# Patient Record
Sex: FEMALE | Race: BLACK OR AFRICAN AMERICAN | NOT HISPANIC OR LATINO | URBAN - METROPOLITAN AREA
[De-identification: names, ages, dates, MRNs, and addresses within clinical notes are randomized per-mention and may not be internally consistent; named-entity substitution may affect disease eponyms.]

---

## 2017-07-26 ENCOUNTER — EMERGENCY (EMERGENCY)
Facility: HOSPITAL | Age: 32
LOS: 1 days | Discharge: HOME | End: 2017-07-26
Attending: EMERGENCY MEDICINE
Payer: COMMERCIAL

## 2017-07-26 VITALS
TEMPERATURE: 98 F | OXYGEN SATURATION: 100 % | DIASTOLIC BLOOD PRESSURE: 73 MMHG | RESPIRATION RATE: 20 BRPM | SYSTOLIC BLOOD PRESSURE: 116 MMHG | HEART RATE: 102 BPM | WEIGHT: 125 LBS

## 2017-07-26 LAB
ALBUMIN SERPL ELPH-MCNC: 3.7 G/DL — SIGNIFICANT CHANGE UP (ref 3.5–5)
ALP SERPL-CCNC: 53 U/L — SIGNIFICANT CHANGE UP (ref 40–120)
ALT FLD-CCNC: 32 U/L DA — SIGNIFICANT CHANGE UP (ref 10–60)
ANION GAP SERPL CALC-SCNC: 5 MMOL/L — SIGNIFICANT CHANGE UP (ref 5–17)
ANISOCYTOSIS BLD QL: SLIGHT — SIGNIFICANT CHANGE UP
APPEARANCE UR: CLEAR — SIGNIFICANT CHANGE UP
APTT BLD: 36.3 SEC — SIGNIFICANT CHANGE UP (ref 27.5–37.4)
AST SERPL-CCNC: 27 U/L — SIGNIFICANT CHANGE UP (ref 10–40)
BACTERIA # UR AUTO: ABNORMAL /HPF
BASOPHILS # BLD AUTO: 0.1 K/UL — SIGNIFICANT CHANGE UP (ref 0–0.2)
BASOPHILS NFR BLD AUTO: 1 % — SIGNIFICANT CHANGE UP (ref 0–2)
BILIRUB SERPL-MCNC: 0.2 MG/DL — SIGNIFICANT CHANGE UP (ref 0.2–1.2)
BILIRUB UR-MCNC: NEGATIVE — SIGNIFICANT CHANGE UP
BLD GP AB SCN SERPL QL: SIGNIFICANT CHANGE UP
BUN SERPL-MCNC: 9 MG/DL — SIGNIFICANT CHANGE UP (ref 7–18)
CALCIUM SERPL-MCNC: 8.9 MG/DL — SIGNIFICANT CHANGE UP (ref 8.4–10.5)
CHLORIDE SERPL-SCNC: 105 MMOL/L — SIGNIFICANT CHANGE UP (ref 96–108)
CO2 SERPL-SCNC: 29 MMOL/L — SIGNIFICANT CHANGE UP (ref 22–31)
COLOR SPEC: YELLOW — SIGNIFICANT CHANGE UP
CREAT SERPL-MCNC: 0.71 MG/DL — SIGNIFICANT CHANGE UP (ref 0.5–1.3)
DIFF PNL FLD: ABNORMAL
ELLIPTOCYTES BLD QL SMEAR: SLIGHT — SIGNIFICANT CHANGE UP
EOSINOPHIL # BLD AUTO: 0.2 K/UL — SIGNIFICANT CHANGE UP (ref 0–0.5)
EOSINOPHIL NFR BLD AUTO: 1.8 % — SIGNIFICANT CHANGE UP (ref 0–6)
EPI CELLS # UR: ABNORMAL (ref 0–10)
GLUCOSE SERPL-MCNC: 91 MG/DL — SIGNIFICANT CHANGE UP (ref 70–99)
GLUCOSE UR QL: NEGATIVE — SIGNIFICANT CHANGE UP
HCG SERPL-ACNC: <1 MIU/ML — SIGNIFICANT CHANGE UP
HCG UR QL: NEGATIVE — SIGNIFICANT CHANGE UP
HCT VFR BLD CALC: 32.6 % — LOW (ref 34.5–45)
HGB BLD-MCNC: 10.2 G/DL — LOW (ref 11.5–15.5)
HYPOCHROMIA BLD QL: SIGNIFICANT CHANGE UP
INR BLD: 1.12 RATIO — SIGNIFICANT CHANGE UP (ref 0.88–1.16)
KETONES UR-MCNC: NEGATIVE — SIGNIFICANT CHANGE UP
LEUKOCYTE ESTERASE UR-ACNC: ABNORMAL
LYMPHOCYTES # BLD AUTO: 3.8 K/UL — HIGH (ref 1–3.3)
LYMPHOCYTES # BLD AUTO: 30.3 % — SIGNIFICANT CHANGE UP (ref 13–44)
MCHC RBC-ENTMCNC: 20.9 PG — LOW (ref 27–34)
MCHC RBC-ENTMCNC: 31.3 GM/DL — LOW (ref 32–36)
MCV RBC AUTO: 66.8 FL — LOW (ref 80–100)
MICROCYTES BLD QL: SLIGHT — SIGNIFICANT CHANGE UP
MONOCYTES # BLD AUTO: 1 K/UL — HIGH (ref 0–0.9)
MONOCYTES NFR BLD AUTO: 7.8 % — SIGNIFICANT CHANGE UP (ref 2–14)
NEUTROPHILS # BLD AUTO: 7.4 K/UL — SIGNIFICANT CHANGE UP (ref 1.8–7.4)
NEUTROPHILS NFR BLD AUTO: 59.2 % — SIGNIFICANT CHANGE UP (ref 43–77)
NITRITE UR-MCNC: NEGATIVE — SIGNIFICANT CHANGE UP
OVALOCYTES BLD QL SMEAR: SLIGHT — SIGNIFICANT CHANGE UP
PH UR: 8 — SIGNIFICANT CHANGE UP (ref 5–8)
PLAT MORPH BLD: NORMAL — SIGNIFICANT CHANGE UP
PLATELET # BLD AUTO: 338 K/UL — SIGNIFICANT CHANGE UP (ref 150–400)
POIKILOCYTOSIS BLD QL AUTO: SLIGHT — SIGNIFICANT CHANGE UP
POTASSIUM SERPL-MCNC: 4.4 MMOL/L — SIGNIFICANT CHANGE UP (ref 3.5–5.3)
POTASSIUM SERPL-SCNC: 4.4 MMOL/L — SIGNIFICANT CHANGE UP (ref 3.5–5.3)
PROT SERPL-MCNC: 8.3 G/DL — SIGNIFICANT CHANGE UP (ref 6–8.3)
PROT UR-MCNC: NEGATIVE — SIGNIFICANT CHANGE UP
PROTHROM AB SERPL-ACNC: 12.2 SEC — SIGNIFICANT CHANGE UP (ref 9.8–12.7)
RBC # BLD: 4.88 M/UL — SIGNIFICANT CHANGE UP (ref 3.8–5.2)
RBC # FLD: 13.9 % — SIGNIFICANT CHANGE UP (ref 10.3–14.5)
RBC BLD AUTO: ABNORMAL
RBC CASTS # UR COMP ASSIST: >50 /HPF (ref 0–2)
SCHISTOCYTES BLD QL AUTO: SLIGHT — SIGNIFICANT CHANGE UP
SODIUM SERPL-SCNC: 139 MMOL/L — SIGNIFICANT CHANGE UP (ref 135–145)
SP GR SPEC: 1.01 — SIGNIFICANT CHANGE UP (ref 1.01–1.02)
UROBILINOGEN FLD QL: NEGATIVE — SIGNIFICANT CHANGE UP
WBC # BLD: 12.4 K/UL — HIGH (ref 3.8–10.5)
WBC # FLD AUTO: 12.4 K/UL — HIGH (ref 3.8–10.5)
WBC UR QL: SIGNIFICANT CHANGE UP /HPF (ref 0–5)

## 2017-07-26 PROCEDURE — 86901 BLOOD TYPING SEROLOGIC RH(D): CPT

## 2017-07-26 PROCEDURE — 76856 US EXAM PELVIC COMPLETE: CPT

## 2017-07-26 PROCEDURE — 86900 BLOOD TYPING SEROLOGIC ABO: CPT

## 2017-07-26 PROCEDURE — 76830 TRANSVAGINAL US NON-OB: CPT

## 2017-07-26 PROCEDURE — 81001 URINALYSIS AUTO W/SCOPE: CPT

## 2017-07-26 PROCEDURE — 80053 COMPREHEN METABOLIC PANEL: CPT

## 2017-07-26 PROCEDURE — 76830 TRANSVAGINAL US NON-OB: CPT | Mod: 26

## 2017-07-26 PROCEDURE — 81025 URINE PREGNANCY TEST: CPT

## 2017-07-26 PROCEDURE — 86850 RBC ANTIBODY SCREEN: CPT

## 2017-07-26 PROCEDURE — 85730 THROMBOPLASTIN TIME PARTIAL: CPT

## 2017-07-26 PROCEDURE — 99284 EMERGENCY DEPT VISIT MOD MDM: CPT | Mod: 25

## 2017-07-26 PROCEDURE — 84702 CHORIONIC GONADOTROPIN TEST: CPT

## 2017-07-26 PROCEDURE — 76856 US EXAM PELVIC COMPLETE: CPT | Mod: 26

## 2017-07-26 PROCEDURE — 85027 COMPLETE CBC AUTOMATED: CPT

## 2017-07-26 PROCEDURE — 85610 PROTHROMBIN TIME: CPT

## 2017-07-26 PROCEDURE — 99285 EMERGENCY DEPT VISIT HI MDM: CPT

## 2017-07-26 RX ORDER — SODIUM CHLORIDE 9 MG/ML
1000 INJECTION INTRAMUSCULAR; INTRAVENOUS; SUBCUTANEOUS
Qty: 0 | Refills: 0 | Status: DISCONTINUED | OUTPATIENT
Start: 2017-07-26 | End: 2017-07-30

## 2017-07-26 RX ORDER — SODIUM CHLORIDE 9 MG/ML
1000 INJECTION INTRAMUSCULAR; INTRAVENOUS; SUBCUTANEOUS ONCE
Qty: 0 | Refills: 0 | Status: COMPLETED | OUTPATIENT
Start: 2017-07-26 | End: 2017-07-26

## 2017-07-26 RX ADMIN — SODIUM CHLORIDE 200 MILLILITER(S): 9 INJECTION INTRAMUSCULAR; INTRAVENOUS; SUBCUTANEOUS at 15:44

## 2017-07-26 RX ADMIN — SODIUM CHLORIDE 1000 MILLILITER(S): 9 INJECTION INTRAMUSCULAR; INTRAVENOUS; SUBCUTANEOUS at 15:25

## 2017-07-26 NOTE — ED PROVIDER NOTE - OBJECTIVE STATEMENT
32 y.o. female LMP 6/17, c/o vag bleed for 1 week. heavy bleeding for past 4-5 days, changed every 2-3 hours, clots, no pelvic pain, dizziness, increased fatigue

## 2017-07-29 DIAGNOSIS — D64.9 ANEMIA, UNSPECIFIED: ICD-10-CM

## 2017-07-29 DIAGNOSIS — N92.0 EXCESSIVE AND FREQUENT MENSTRUATION WITH REGULAR CYCLE: ICD-10-CM

## 2022-09-13 NOTE — ED ADULT NURSE NOTE - CHIEF COMPLAINT QUOTE
Heavy vaginal bleed x 4 days.  Lmp unknown as patient is irregular.
Detail Level: Zone
Other (Free Text): Previously biopsied,no treatment necessary. Patient can schedule excision prn.
Note Text (......Xxx Chief Complaint.): This diagnosis correlates with the

## 2023-04-03 PROBLEM — D64.9 ANEMIA, UNSPECIFIED: Chronic | Status: ACTIVE | Noted: 2017-07-26

## 2023-08-04 ENCOUNTER — APPOINTMENT (OUTPATIENT)
Dept: INTERNAL MEDICINE | Facility: CLINIC | Age: 38
End: 2023-08-04
Payer: COMMERCIAL

## 2023-08-04 ENCOUNTER — LABORATORY RESULT (OUTPATIENT)
Age: 38
End: 2023-08-04

## 2023-08-04 VITALS
SYSTOLIC BLOOD PRESSURE: 110 MMHG | HEART RATE: 72 BPM | BODY MASS INDEX: 24.17 KG/M2 | HEIGHT: 61 IN | WEIGHT: 128 LBS | TEMPERATURE: 99.4 F | OXYGEN SATURATION: 98 % | DIASTOLIC BLOOD PRESSURE: 72 MMHG

## 2023-08-04 DIAGNOSIS — N60.11 DIFFUSE CYSTIC MASTOPATHY OF RIGHT BREAST: ICD-10-CM

## 2023-08-04 DIAGNOSIS — Z00.00 ENCOUNTER FOR GENERAL ADULT MEDICAL EXAMINATION W/OUT ABNORMAL FINDINGS: ICD-10-CM

## 2023-08-04 PROCEDURE — 99213 OFFICE O/P EST LOW 20 MIN: CPT | Mod: 25

## 2023-08-04 PROCEDURE — 99385 PREV VISIT NEW AGE 18-39: CPT | Mod: 25

## 2023-08-11 ENCOUNTER — NON-APPOINTMENT (OUTPATIENT)
Age: 38
End: 2023-08-11

## 2023-08-11 DIAGNOSIS — R79.9 ABNORMAL FINDING OF BLOOD CHEMISTRY, UNSPECIFIED: ICD-10-CM

## 2023-08-11 LAB
25(OH)D3 SERPL-MCNC: 29.3 NG/ML
CHOLEST SERPL-MCNC: 126 MG/DL
ESTIMATED AVERAGE GLUCOSE: 114 MG/DL
HBA1C MFR BLD HPLC: 5.6 %
HDLC SERPL-MCNC: 47 MG/DL
LDLC SERPL CALC-MCNC: 64 MG/DL
NONHDLC SERPL-MCNC: 80 MG/DL
TRIGL SERPL-MCNC: 80 MG/DL
TSH SERPL-ACNC: 3.13 UIU/ML
VIT B12 SERPL-MCNC: 406 PG/ML

## 2023-08-17 PROBLEM — Z00.00 ENCOUNTER FOR PREVENTIVE HEALTH EXAMINATION: Status: ACTIVE | Noted: 2023-07-28

## 2023-08-17 NOTE — PHYSICAL EXAM
[de-identified] : Gen: Adult F, NAD Head: NC/AT EENT: ears grossly normal, PERRL, EOMI, moist mucosa Neck: supple Chest wall: nontender CV: normal s1 +s2, rrr, no murmurs Pulm: CTA-B Abd: soft, NT, ND Skin: no rashes Back: no CVA tenderness, no spinal tenderness Neuro: gait normal, AAOx3 Psych: normal affect, normal interaction

## 2023-08-17 NOTE — HISTORY OF PRESENT ILLNESS
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

## 2023-08-17 NOTE — ASSESSMENT
[FreeTextEntry1] : 39yo F seen for CPE and to est care  CPE today Labs today  needs new GYN in [facility] had a mammogram  - 2/2 finding on exam - wnl told she has dense breasts  hx of thalessemia - needs to est with new heme-oncology has seen derm for her face- breaking out sees dental

## 2023-08-18 ENCOUNTER — TRANSCRIPTION ENCOUNTER (OUTPATIENT)
Age: 38
End: 2023-08-18

## 2023-08-19 ENCOUNTER — LABORATORY RESULT (OUTPATIENT)
Age: 38
End: 2023-08-19

## 2023-08-25 DIAGNOSIS — D56.9 THALASSEMIA, UNSPECIFIED: ICD-10-CM

## 2023-08-25 DIAGNOSIS — D50.8 OTHER IRON DEFICIENCY ANEMIAS: ICD-10-CM

## 2023-08-30 LAB
APPEARANCE: CLEAR
BACTERIA UR CULT: NORMAL
BACTERIA: NEGATIVE /HPF
BILIRUBIN URINE: NEGATIVE
BLOOD URINE: NEGATIVE
CAST: 0 /LPF
COLOR: YELLOW
EPITHELIAL CELLS: 0 /HPF
FERRITIN SERPL-MCNC: 15 NG/ML
GLUCOSE QUALITATIVE U: NEGATIVE MG/DL
IRON SATN MFR SERPL: 13 %
IRON SERPL-MCNC: 43 UG/DL
KETONES URINE: NEGATIVE MG/DL
LEUKOCYTE ESTERASE URINE: NEGATIVE
MICROSCOPIC-UA: NORMAL
NITRITE URINE: NEGATIVE
PH URINE: 8
PROTEIN URINE: NEGATIVE MG/DL
RED BLOOD CELLS URINE: 0 /HPF
SPECIFIC GRAVITY URINE: 1.01
TIBC SERPL-MCNC: 316 UG/DL
UIBC SERPL-MCNC: 273 UG/DL
UROBILINOGEN URINE: 0.2 MG/DL
WHITE BLOOD CELLS URINE: 0 /HPF

## 2023-12-29 ENCOUNTER — APPOINTMENT (OUTPATIENT)
Dept: OBGYN | Facility: CLINIC | Age: 38
End: 2023-12-29
Payer: COMMERCIAL

## 2023-12-29 ENCOUNTER — ASOB RESULT (OUTPATIENT)
Age: 38
End: 2023-12-29

## 2023-12-29 VITALS
HEIGHT: 61 IN | BODY MASS INDEX: 26.43 KG/M2 | DIASTOLIC BLOOD PRESSURE: 58 MMHG | WEIGHT: 140 LBS | SYSTOLIC BLOOD PRESSURE: 102 MMHG

## 2023-12-29 DIAGNOSIS — Z31.9 ENCOUNTER FOR PROCREATIVE MANAGEMENT, UNSPECIFIED: ICD-10-CM

## 2023-12-29 DIAGNOSIS — Z82.49 FAMILY HISTORY OF ISCHEMIC HEART DISEASE AND OTHER DISEASES OF THE CIRCULATORY SYSTEM: ICD-10-CM

## 2023-12-29 DIAGNOSIS — Z83.3 FAMILY HISTORY OF DIABETES MELLITUS: ICD-10-CM

## 2023-12-29 DIAGNOSIS — Z86.2 PERSONAL HISTORY OF DISEASES OF THE BLOOD AND BLOOD-FORMING ORGANS AND CERTAIN DISORDERS INVOLVING THE IMMUNE MECHANISM: ICD-10-CM

## 2023-12-29 DIAGNOSIS — D21.9 BENIGN NEOPLASM OF CONNECTIVE AND OTHER SOFT TISSUE, UNSPECIFIED: ICD-10-CM

## 2023-12-29 DIAGNOSIS — Z80.3 FAMILY HISTORY OF MALIGNANT NEOPLASM OF BREAST: ICD-10-CM

## 2023-12-29 DIAGNOSIS — Z80.0 FAMILY HISTORY OF MALIGNANT NEOPLASM OF DIGESTIVE ORGANS: ICD-10-CM

## 2023-12-29 PROCEDURE — 99385 PREV VISIT NEW AGE 18-39: CPT

## 2024-01-01 PROBLEM — Z80.3 FAMILY HISTORY OF MALIGNANT NEOPLASM OF BREAST: Status: ACTIVE | Noted: 2024-01-01

## 2024-01-01 PROBLEM — Z82.49 FAMILY HISTORY OF CARDIAC DISORDER: Status: ACTIVE | Noted: 2024-01-01

## 2024-01-01 PROBLEM — Z83.3 FAMILY HISTORY OF DIABETES MELLITUS: Status: ACTIVE | Noted: 2024-01-01

## 2024-01-01 PROBLEM — Z80.0 FAMILY HISTORY OF MALIGNANT NEOPLASM OF ESOPHAGUS: Status: ACTIVE | Noted: 2024-01-01

## 2024-01-01 PROBLEM — Z86.2 HISTORY OF ANEMIA: Status: RESOLVED | Noted: 2024-01-01 | Resolved: 2024-01-01

## 2024-01-01 LAB
C TRACH RRNA SPEC QL NAA+PROBE: NOT DETECTED
HPV HIGH+LOW RISK DNA PNL CVX: NOT DETECTED
N GONORRHOEA RRNA SPEC QL NAA+PROBE: NOT DETECTED
SOURCE AMPLIFICATION: NORMAL

## 2024-01-01 NOTE — DISCUSSION/SUMMARY
[FreeTextEntry1] : 38 year old P0 presenting for annual exam -f/u pap and GC/CT -Fibroids: small myoma, asymptomatic - will continue to monitor -Contraception: none, okay with pregnancy, taking PNV, TTC information given -f/u PRN

## 2024-01-01 NOTE — HISTORY OF PRESENT ILLNESS
[FreeTextEntry1] : Patient is a 38 year old P0 presenting for an annual visit. LMP 12/8/23. She is feeling well and is without complaints. She denies vaginal itching, odor and discharge. Denies urinary urgency, frequency and dysuria. She is currently sexually active with one male partner considering starting a family soon. Patient reports a diagnosis of fibroids but does not know any further information.    [Patient reported PAP Smear was normal] : Patient reported PAP Smear was normal [PapSmeardate] : 12/2022

## 2024-01-01 NOTE — PROCEDURE
[Transvaginal Ultrasound] : transvaginal ultrasound [FreeTextEntry3] : Small 1cm intramural fundal myoma, normal ovaries

## 2024-01-09 LAB — CYTOLOGY CVX/VAG DOC THIN PREP: NORMAL

## 2024-01-23 LAB
3-BETA-HYDROXYSTEROID DEHYDROGENASE II: NEGATIVE
3-HYDROXY-3-METHYLGLUTARYL-COA LYASE DEF: NEGATIVE
3-METHYLCROTONYL-COA CARBOXYLASE 1 DEFIC: NEGATIVE
3-METHYLCROTONYL-COA CARBOXYLASE 2 DEFIC: NEGATIVE
3-PHOSPHOGLYCERATE DEHYDROGENASE DEFICIE: NEGATIVE
6-PYRUVOYL-TETRAHYDROPTERIN SYNTHASE: NEGATIVE
ABETALIPOPROTEINEMIA: NEGATIVE
ACHONDROGENESIS, TYPE 1B: NEGATIVE
ACHROMATOPSIA: NEGATIVE
ACRODERMATITIS ENTEROPATHICA: NEGATIVE
ACUTE INFANTILE LIVER FAILURE: NEGATIVE
ACYL-COA OXIDASE I DEFICIENCY: NEGATIVE
ADRENOLEUKODYSTROPHY: NEGATIVE
AICARDI-GOUTIÈRES SYNDROME: NEGATIVE
ALPHA THALASSEMIA MENTAL RETARDATION SYN: NEGATIVE
ALPHA-MANNOSIDOSIS: NEGATIVE
ALPHA-THALASSEMIA: POSITIVE
ALPORT SYNDROME, COL4A3-RELATED: NEGATIVE
ALPORT SYNDROME, COL4A4-RELATED: NEGATIVE
ALPORT SYNDROME, X-LINKED: NEGATIVE
ALSTROM SYNDROME: NEGATIVE
ANDERMANN SYNDROME: NEGATIVE
ARGININOSUCCINATE LYASE DEFICIENCY: NEGATIVE
AROMATASE DEFICIENCY: NEGATIVE
ASPARAGINE SYNTHETASE DEFICIENCY: NEGATIVE
ASPARTYLGLYCOSAMINURIA: NEGATIVE
ATAXIA WITH VITAMIN E DEFICIENCY: NEGATIVE
ATAXIA-TELANGIECTASIA: NEGATIVE
AUTISM SPECTRUM, EPILEPSY AND ARTHROGRYP: NEGATIVE
AUTOSOMAL RECESSIVE SPASTIC ATAXIA OF CH: NEGATIVE
BARDET-BIEDL SYNDROME, BBS1-RELATED: NEGATIVE
BARDET-BIEDL SYNDROME, BBS10-RELATED: NEGATIVE
BARDET-BIEDL SYNDROME, BBS12-RELATED: NEGATIVE
BARDET-BIEDL SYNDROME, BBS2-RELATED: NEGATIVE
BARE LYMPHOCYTE SYNDROME, TYPE II: NEGATIVE
BARTTER SYNDROME: NEGATIVE
BATTEN DISEASE: NEGATIVE
BETA-HEMOGLOBINOPATHIES: NEGATIVE
BILATERAL FRONTOPARIETAL POLYMICROGYRIA: NEGATIVE
BIOTINIDASE DEFICIENCY: NEGATIVE
BLOOM SYNDROME: NEGATIVE
CANAVAN DISEASE: NEGATIVE
CARBAMOYL PHOSPHATE SYNTHETASE I DEF: NEGATIVE
CARNITINE DEFICIENCY: NEGATIVE
CARNITINE PALMITOYLTRANSFERASE IA DEF: NEGATIVE
CARNITINE PALMITOYLTRANSFERASE II DEF: NEGATIVE
CARPENTER SYNDROME: NEGATIVE
CARTILAGE-HAIR HYPOPLASIA: NEGATIVE
CEREBROTENDINOUS XANTHOMATOSIS: NEGATIVE
CFTR MUT ANL BLD/T: NEGATIVE
CHARCOT-MARIE-TOOTH DISEASE WITH DEAFNES: NEGATIVE
CHARCOT-MARIE-TOOTH DISEASE, TYPE 4D: NEGATIVE
CHOREOACANTHOCYTOSIS: NEGATIVE
CHOROIDEREMIA: NEGATIVE
CHRONIC GRANULOMATOUS DISEASE, CYTOCHROM: NEGATIVE
CHRONIC GRANULOMATOUS DISEASE, X-LINKED: NEGATIVE
CILIOPATHIES, RPGRIP1L-RELATED: NEGATIVE
CITRIN DEFICIENCY: NEGATIVE
CITRULLINEMIA, TYPE I: NEGATIVE
COHEN SYNDROME: NEGATIVE
COMBINED MALONIC AND METHYLMALONIC ACIDU: NEGATIVE
COMBINED OXIDATIVE PHOSPHORYLATION DEF 3: NEGATIVE
COMBINED OXIDATIVE PHOSPHORYLATION DEF 4: NEGATIVE
COMBINED PITUITARY HORMONE DEFICIENCY-2: NEGATIVE
CONGENITAL ADRENAL HYPERPLASIA, 17-ALPHA: NEGATIVE
CONGENITAL AMEGAKARYOCYTIC THROMBOCYTOPE: NEGATIVE
CONGENITAL DISORDER OF GLYCOSYLATION 1C: NEGATIVE
CONGENITAL DISORDER OF GLYCOSYLATION IA: NEGATIVE
CONGENITAL DISORDER OF GLYCOSYLATION IB: NEGATIVE
CONGENITAL FINNISH NEPHROSIS: NEGATIVE
CONGENITAL INSENSIVITY TO PAIN WITH ANHI: NEGATIVE
CONGENITAL MYASTHENIC SYNDROME, CHRNE: NEGATIVE
CONGENITAL MYASTHENIC SYNDROME, RAPSN-RE: NEGATIVE
CONGENITAL NEUTROPENIA, HAX1-RELATED: NEGATIVE
CONGENITAL NEUTROPENIA, VPS45-RELATED: NEGATIVE
CORNEAL DYSTROPHY AND PERCEPTIVE DEAFNES: NEGATIVE
CORTICOSTERONE METHYLOXIDASE DEFICIENCY: NEGATIVE
COSTEFF DISEASE OPTIC ATROPHY: NEGATIVE
CRB1-RELATED RETINAL DYSTROPHIES: NEGATIVE
CREATINE TRANSPORTER DEFECT: NEGATIVE
CYSTINOSIS: NEGATIVE
D-BIFUNCTIONAL PROTEIN DEFICIENCY: NEGATIVE
DEAFNESS, AUTOSOMAL RECESSIVE 77: NEGATIVE
DMD GENE MUT ANL BLD/T: NEGATIVE
DYSKERATOSIS CONGENITA: NEGATIVE
DYSTROPHIC EPIDERMOLYSIS BULLOSA: NEGATIVE
EHLERS-DANLOS SYNDROME, TYPE VIIC: NEGATIVE
ELLIS-VAN CREVELD SYNDOME: NEGATIVE
EMERY-DREIFUSS MUSCULAR DYSTROPHY 1 X-LI: NEGATIVE
ENHANCED S-CONE SYNDROME: NEGATIVE
ETHYLMALONIC ENCEPHALOPATHY: NEGATIVE
FABRY DISEASE: NEGATIVE
FACTOR IX DEFICIENCY: NEGATIVE
FACTOR XI DEFICIENCY: NEGATIVE
FAMILIAL DYSAUTONOMIA: NEGATIVE
FAMILIAL HYPERCHOLESTEROLEMIA, LDLR: NEGATIVE
FAMILIAL HYPERCHOLESTEROLEMIA, LDLRAP1: NEGATIVE
FAMILIAL HYPERINSULINISM: NEGATIVE
FAMILIAL MEDITERRANEAN FEVER: NEGATIVE
FAMILIAL NEUROPOPHYSEAL DIABETES INSIPID: NEGATIVE
FANCONI ANEMIA, GROUP A: NEGATIVE
FANCONI ANEMIA, GROUP C: NEGATIVE
FANCONI ANEMIA, GROUP G: NEGATIVE
FRAGILE X PROTEIN (FMRP) BLD-IMP: NEGATIVE
FUMARASE DEFICIENCY: NEGATIVE
GALACTOKINASE DEFICIENCY: NEGATIVE
GALACTOSEMIA: NEGATIVE
GAUCHER DISEASE: NEGATIVE
GITELMAN SYNDROME: NEGATIVE
GLUTARIC ACIDEMIA, TYPE 2A: NEGATIVE
GLUTARIC ACIDEMIA, TYPE 2C: NEGATIVE
GLUTARYL-COA DEHYDROGENASE DEFICIENCY: NEGATIVE
GLYCINE ENCEPHALOPATHY, AMT-RELATED: NEGATIVE
GLYCINE ENCEPHALOPATHY: NEGATIVE
GLYCOGEN STORAGE DISEASE, TYPE 1A: NEGATIVE
GLYCOGEN STORAGE DISEASE, TYPE 2: NEGATIVE
GLYCOGEN STORAGE DISEASE, TYPE 3: NEGATIVE
GLYCOGEN STORAGE DISEASE, TYPE 4: NEGATIVE
GLYCOGEN STORAGE DISEASE, TYPE 5: NEGATIVE
GLYCOGEN STORAGE DISEASE, TYPE IB: NEGATIVE
GLYCOGEN STORAGE DISEASE, TYPE VII: NEGATIVE
GRACILE SYNDROME: NEGATIVE
GUANIDINOACETATE METHYLTRANSFERASE DEFIC: NEGATIVE
HEMOCHROMATOSIS, TYPE 2A: NEGATIVE
HEMOCHROMATOSIS, TYPE 3, TFR2-RELATED: NEGATIVE
HEREDITARY FRUCTOSE INTOLERANCE: NEGATIVE
HEREDITARY SPASTIC PARAPARESIS, TYPE 49: NEGATIVE
HERMANSKY-PUDLAK SYNDROME, HPS1-RELATED: NEGATIVE
HERMANSKY-PUDLAK SYNDROME, HPS3-RELATED: NEGATIVE
HEXOSAMINIDASE A & B BLD-IMP: NEGATIVE
HOLOCARBOXYLASE SYNTHETASE DEFICIENCY: NEGATIVE
HOMOCYSTINURIA DUE TO DEFIC OF MTHFR: NEGATIVE
HOMOCYSTINURIA, TYPE CBLE: NEGATIVE
HOMOCYSTINURIA: NEGATIVE
HYDROLETHALUS SYNDROME: NEGATIVE
HYPERINSULINEMIC HYPOGLYCEMIA: NEGATIVE
HYPERORNITHINEMIA-HYPERAMMONEMIA-HOMOCIT: NEGATIVE
HYPOHIDROTIC ECTODERMAL DYSPLASIA X-LINK: NEGATIVE
HYPOPHOSPHATASIA, AUTOSOMAL RECESSIVE: NEGATIVE
INCLUSION BODY MYOPATHY 2: NEGATIVE
INFANTILE CEREBRAL AND CEREBELLAR ATROPH: NEGATIVE
ISOVALERIC ACIDEMIA: NEGATIVE
JOUBERT SYNDROME 2: NEGATIVE
KETOTHIOLASE DEFICIENCY: NEGATIVE
KRABBE DISEASE: NEGATIVE
LAMELLAR ICHTHYOSIS, TYPE 1: NEGATIVE
LEBER CONGENITAL AMAUROSIS 2: NEGATIVE
LEBER CONGENITAL AMAUROSIS, TYPE CEP290: NEGATIVE
LEBER CONGENITAL AMAUROSIS, TYPE LCA5: NEGATIVE
LEBER CONGENITAL AMAUROSIS, TYPE RDH12: NEGATIVE
LEIGH SYNDROME, FRENCH-CANADIAN TYPE: NEGATIVE
LETHAL CONGENITAL CONTRACTURE SYNDROME 1: NEGATIVE
LEUKOENCEPHALOPATHY WITH VANISHING WHITE: NEGATIVE
LIMB GIRDLE MUSCULAR DYSTROPHY, TYPE 2A: NEGATIVE
LIMB GIRDLE MUSCULAR DYSTROPHY, TYPE 2B: NEGATIVE
LIMB GIRDLE MUSCULAR DYSTROPHY, TYPE 2I: NEGATIVE
LIMB-GIRDLE MUSCULAR DYSTROPHY, TYPE 2C: NEGATIVE
LIMB-GIRDLE MUSCULAR DYSTROPHY, TYPE 2D: NEGATIVE
LIMB-GIRDLE MUSCULAR DYSTROPHY, TYPE 2E: NEGATIVE
LIPOAMIDE DEHYDROGENASE DEFICIENCY: NEGATIVE
LIPOID ADRENAL HYPERPLASIA: NEGATIVE
LIPOPROTEIN LIPASE DEFICIENCY: NEGATIVE
LONG CHAIN 3-HYDROXYACYL-COA DEHYDROGENA: NEGATIVE
LYSINURIC PROTEIN INTOLERANCE: NEGATIVE
MAPLE SYRUP URINE DISEASE, TYPE 1A: NEGATIVE
MAPLE SYRUP URINE DISEASE, TYPE 1B: NEGATIVE
MECKEL-GRUBER SYNDROME, TYPE 1: NEGATIVE
MEDIUM CHAIN ACYL-COA DEHYDROGENASE DEFICIENCY: NEGATIVE
MEGALENCEPHALIC LEUKOENCEPHALOPATHY: NEGATIVE
METACHROMATIC LEUKODYSTROPHY GAUCHER: NEGATIVE
METACHROMATIC LEUKODYSTROPHY, ARSA: NEGATIVE
METHYLMALONIC ACIDURIA AND HOMOCYST CBIC: NEGATIVE
METHYLMALONIC ACIDURIA AND HOMOCYST CBID: NEGATIVE
METHYLMALONIC ACIDURIA, MMAA-RELATED: NEGATIVE
METHYLMALONIC ACIDURIA, MMAB-RELATED: NEGATIVE
METHYLMALONIC ACIDURIA, TYPE MUT(0): NEGATIVE
MICROPHTHALMIA/ANOPHTHALMIA: NEGATIVE
MITOCHONDRIAL COMPLEX 1 DEFIC NDUFAF5: NEGATIVE
MITOCHONDRIAL COMPLEX 1 DEFIC NDUFS6: NEGATIVE
MITOCHONDRIAL DNA DEPLETION SYNDROME 6: NEGATIVE
MITOCHONDRIAL MYOPATHY AND SIDEROBLASTIC: NEGATIVE
MUCOLIPIDOSIS II/IIIA: NEGATIVE
MUCOLIPIDOSIS III GAMMA: NEGATIVE
MUCOLIPIDOSIS, TYPE IV: NEGATIVE
MUCOPOLYSACCHARIDOSIS, TYPE I: NEGATIVE
MUCOPOLYSACCHARIDOSIS, TYPE II: NEGATIVE
MUCOPOLYSACCHARIDOSIS, TYPE IIIA: NEGATIVE
MUCOPOLYSACCHARIDOSIS, TYPE IIIB: NEGATIVE
MUCOPOLYSACCHARIDOSIS, TYPE IIIC: NEGATIVE
MUCOPOLYSACCHARIDOSIS, TYPE IIID: NEGATIVE
MUCOPOLYSACCHARIDOSIS, TYPE IVB: NEGATIVE
MUCOPOLYSACCHARIDOSIS, TYPE IX: NEGATIVE
MUCOPOLYSACCHARIDOSIS, TYPE VI: NEGATIVE
MULTIPLE SULPHATASE DEFICIENCY: NEGATIVE
MUSCLE-EYE-BRAIN DISEASE, POMGNT1-RELATE: NEGATIVE
MYONEUROGASTROINTESTINAL ENCEPHALOPATHY: NEGATIVE
MYOTUBULAR MYOPATHY, X-LINKED: NEGATIVE
N-ACETYLGLUTAMATE SYNTHASE DEFICIENCY: NEGATIVE
NEMALINE MYOPATHY: NEGATIVE
NEURONAL CEROID LIPOFUSCINOSIS, CLN5: NEGATIVE
NEURONAL CEROID LIPOFUSCINOSIS, MFSD8: NEGATIVE
NEURONAL CEROID LIPOFUSCINOSIS, PPT1-REL: NEGATIVE
NEURONAL CEROID LIPOFUSCINOSIS, TPP1-REL: NEGATIVE
NEURONAL CEROID-LIPOFUSCINOSIS, CLN6: NEGATIVE
NEURONAL CEROID-LIPOFUSCINOSIS, CLN8: NEGATIVE
NIEMANN PICK DISEASE, TYPE C1/D: NEGATIVE
NIEMANN PICK DISEASE, TYPE C2: NEGATIVE
NIEMANN-PICK DISEASE, TYPES A/B: NEGATIVE
NIJMEGEN BREAKAGE SYNDROME: NEGATIVE
NON-SYNDROMIC HEARING LOSS, GJB2-RELATED: NEGATIVE
OCCIPITAL HORN SYNDROME: NEGATIVE
ODONTO-ONYCHO-DERMAL DYSPLASIA: NEGATIVE
OMENN SYNDROME: NEGATIVE
ORNITHINE AMINOTRANSFERASE DEFICIENCY: NEGATIVE
ORNITHINE TRANSCARBAMYLASE DEFICIENCY: NEGATIVE
OSTEOPETROSIS, INFANTILE MALIGNANT: NEGATIVE
PENDRED SYNDROME: NEGATIVE
PHENYLKETONURIA: NEGATIVE
PITUITARY HORMONE DEFICIENCY, COMBINED 3: NEGATIVE
POLYCYSTIC KIDNEY DISEASE, AUTOSOMAL RECESSIVE: NEGATIVE
POLYGLANDULAR AUTOIMMUNE SYNDROME: NEGATIVE
PONTOCEREBELLAR HYPOPLASIA, RARS2-RELATE: NEGATIVE
PONTOCEREBELLAR HYPOPLASIA, TYPE 1A: NEGATIVE
PRIMARY CILIARY DYSKINESIA DNAH5-RELATED: NEGATIVE
PRIMARY CILIARY DYSKINESIA DNAI1-RELATED: NEGATIVE
PRIMARY CILIARY DYSKINESIA DNAI2-RELATED: NEGATIVE
PRIMARY HYPEROXALURIA, TYPE 1: NEGATIVE
PRIMARY HYPEROXALURIA, TYPE 2: NEGATIVE
PRIMARY HYPEROXALURIA, TYPE 3: NEGATIVE
PROGRESSIVE CEREBELLO-CEREBRAL ATROPHY: NEGATIVE
PROGRESSIVE FAMILIAL INTRAHEPATIC CHOLES: NEGATIVE
PROPIONIC ACIDEMIA, ALPHA SUBUNIT: NEGATIVE
PROPIONIC ACIDEMIA, BETA SUBUNIT: NEGATIVE
PYCNODYSOSTOSIS: NEGATIVE
PYRUVATE DEHYDROGENASE DEFICIENCY AUTOSO: NEGATIVE
PYRUVATE DEHYDROGENASE DEFICIENCY X-LINK: NEGATIVE
RENAL TUBULAR ACIDOSIS AND DEAFNESS: NEGATIVE
REPRODUCTIVE CARRIER MULTIGENE ANL BLD/T: POSITIVE
RETINITIS PIGMENTOSA 25: NEGATIVE
RETINITIS PIGMENTOSA 26: NEGATIVE
RETINITIS PIGMENTOSA 28: NEGATIVE
RETINITIS PIGMENTOSA 59: NEGATIVE
RHIZOMELIC CHONDRODYSPLASIA PUNCTATA 3: NEGATIVE
RHIZOMELIC CHONDRODYSPLASIA PUNCTATA I: NEGATIVE
RIBOFLAVIN RESPONSIVE COMPLEX 1 DEF: NEGATIVE
ROBERTS SYNDROME: NEGATIVE
SALLA DISEASE: NEGATIVE
SANDHOFF DISEASE: NEGATIVE
SCHIMKE IMMUNOOSSEOUS DYSPLASIA: NEGATIVE
SEGAWA SYNDROME, AUTOSOMAL RECESSIVE: NEGATIVE
SEVERE COMBINED IMMUNODEFICIENCY, TYPE A: NEGATIVE
SEVERE COMBINED IMMUNODEFICIENCY: NEGATIVE
SJOGREN-LARSSON SYNDROME: NEGATIVE
SMITH-LEMLI-OPITZ SYNDROME: NEGATIVE
SMN1 GENE MUT ANL BLD/T: NEGATIVE
SPONDYLOTHORACIC DYSOSTOSIS: NEGATIVE
STEROID-RESISTANT NEPHROTIC SYNDROME: NEGATIVE
STUVE-WIEDEMANN SYNDROME: NEGATIVE
TEST PERFORMANCE INFO SPEC: NORMAL
TYROSINEMIA, TYPE I: NEGATIVE
USHER SYNDROME, TYPE 1B: NEGATIVE
USHER SYNDROME, TYPE 1C: NEGATIVE
USHER SYNDROME, TYPE 1D: NEGATIVE
USHER SYNDROME, TYPE 1F: NEGATIVE
USHER SYNDROME, TYPE 2A: NEGATIVE
USHER SYNDROME, TYPE 3: NEGATIVE
VERY LONG CHAIN ACYL-COA DEHYDROGENASE: NEGATIVE
WALKER-WARBURG SYNDROME: NEGATIVE
WILSON DISEASE: NEGATIVE
WOLMAN DISEASE: NEGATIVE
X-LINKED JUVENILE RETINOSCHISIS: NEGATIVE
X-LINKED SEVERE COMBINED IMMUNODEFICIENC: NEGATIVE
ZELLWEGER SPECTRUM DISORDERS, PEX1-RELAT: NEGATIVE
ZELLWEGER SPECTRUM DISORDERS, PEX10-RELA: NEGATIVE
ZELLWEGER SPECTRUM DISORDERS, PEX2-RELAT: NEGATIVE
ZELLWEGER SPECTRUM DISORDERS, PEX6-RELAT: NEGATIVE

## 2024-03-27 ENCOUNTER — NON-APPOINTMENT (OUTPATIENT)
Age: 39
End: 2024-03-27

## 2024-03-29 DIAGNOSIS — Z86.2 PERSONAL HISTORY OF DISEASES OF THE BLOOD AND BLOOD-FORMING ORGANS AND CERTAIN DISORDERS INVOLVING THE IMMUNE MECHANISM: ICD-10-CM

## 2024-03-29 RX ORDER — CHLORHEXIDINE GLUCONATE 4 %
325 (65 FE) LIQUID (ML) TOPICAL 3 TIMES DAILY
Qty: 270 | Refills: 1 | Status: ACTIVE | COMMUNITY
Start: 2023-08-25 | End: 1900-01-01

## 2024-06-10 ENCOUNTER — TRANSCRIPTION ENCOUNTER (OUTPATIENT)
Age: 39
End: 2024-06-10

## 2024-06-11 ENCOUNTER — APPOINTMENT (OUTPATIENT)
Dept: INTERNAL MEDICINE | Facility: CLINIC | Age: 39
End: 2024-06-11
Payer: COMMERCIAL

## 2024-06-11 VITALS
SYSTOLIC BLOOD PRESSURE: 107 MMHG | HEART RATE: 101 BPM | OXYGEN SATURATION: 99 % | TEMPERATURE: 97.3 F | DIASTOLIC BLOOD PRESSURE: 69 MMHG | BODY MASS INDEX: 26.24 KG/M2 | HEIGHT: 61 IN | WEIGHT: 139 LBS

## 2024-06-11 DIAGNOSIS — R05.3 CHRONIC COUGH: ICD-10-CM

## 2024-06-11 DIAGNOSIS — R11.2 NAUSEA WITH VOMITING, UNSPECIFIED: ICD-10-CM

## 2024-06-11 DIAGNOSIS — J39.2 OTHER DISEASES OF PHARYNX: ICD-10-CM

## 2024-06-11 DIAGNOSIS — Z02.89 ENCOUNTER FOR OTHER ADMINISTRATIVE EXAMINATIONS: ICD-10-CM

## 2024-06-11 PROCEDURE — 36415 COLL VENOUS BLD VENIPUNCTURE: CPT

## 2024-06-11 PROCEDURE — 99214 OFFICE O/P EST MOD 30 MIN: CPT

## 2024-06-11 RX ORDER — ALBUTEROL SULFATE 90 UG/1
108 (90 BASE) AEROSOL, METERED RESPIRATORY (INHALATION) EVERY 6 HOURS
Qty: 1 | Refills: 3 | Status: COMPLETED | COMMUNITY
Start: 2024-03-29 | End: 2024-06-11

## 2024-06-11 RX ORDER — AZITHROMYCIN 250 MG/1
250 TABLET, FILM COATED ORAL
Qty: 1 | Refills: 0 | Status: COMPLETED | COMMUNITY
Start: 2024-03-29 | End: 2024-06-11

## 2024-06-11 NOTE — ASSESSMENT
[FreeTextEntry1] : 38yo F with fibroids, anemia and chronic cough seen for acute visit for dry throat in setting of nausea and vomiting and frequent loose stool  Loose stools - no recent abx or travel, less likely gastroenteritis or c.diff still will send stool cx and c diff   nausea vomitting - ddx includes pregnancy, GI lesion, gastritis, food insensitivity/allergy.         send serum hcg, check H Pylori          cont to hydrate            can consider food log but other testing was neg in recent past  Dry throat - can try taking OTC allergy medication  - will check for Sjogrens                 ENT referral

## 2024-06-11 NOTE — PHYSICAL EXAM
[Well Nourished] : well nourished [Well Developed] : well developed [PERRL] : pupils equal round and reactive to light [No Lymphadenopathy] : no lymphadenopathy [Supple] : supple [No Accessory Muscle Use] : no accessory muscle use [Clear to Auscultation] : lungs were clear to auscultation bilaterally [Regular Rhythm] : with a regular rhythm [Normal S1, S2] : normal S1 and S2 [No Edema] : there was no peripheral edema [Normal Supraclavicular Nodes] : no supraclavicular lymphadenopathy [Normal Axillary Nodes] : no axillary lymphadenopathy [Normal Anterior Cervical Nodes] : no anterior cervical lymphadenopathy [Normal Affect] : the affect was normal [Normal Insight/Judgement] : insight and judgment were intact [de-identified] : +wax in both ears, small bumps, skin colored, no enlarged tonsils, well hydrated, no exudate

## 2024-06-14 ENCOUNTER — APPOINTMENT (OUTPATIENT)
Dept: INTERNAL MEDICINE | Facility: CLINIC | Age: 39
End: 2024-06-14
Payer: COMMERCIAL

## 2024-06-14 LAB
ALBUMIN SERPL ELPH-MCNC: 4.4 G/DL
ALP BLD-CCNC: 45 U/L
ALT SERPL-CCNC: 21 U/L
ANION GAP SERPL CALC-SCNC: 12 MMOL/L
APPEARANCE: CLEAR
AST SERPL-CCNC: 21 U/L
BACTERIA UR CULT: NORMAL
BACTERIA: ABNORMAL /HPF
BILIRUB SERPL-MCNC: 0.5 MG/DL
BILIRUBIN URINE: NEGATIVE
BLOOD URINE: NEGATIVE
BUN SERPL-MCNC: 4 MG/DL
CALCIUM SERPL-MCNC: 9.1 MG/DL
CAST: 0 /LPF
CHLORIDE SERPL-SCNC: 105 MMOL/L
CHOLEST SERPL-MCNC: 116 MG/DL
CO2 SERPL-SCNC: 21 MMOL/L
COLOR: YELLOW
CREAT SERPL-MCNC: 0.65 MG/DL
EGFR: 116 ML/MIN/1.73M2
ENA SS-A AB SER IA-ACNC: 5.7 AL
ENA SS-B AB SER IA-ACNC: <0.2 AL
EPITHELIAL CELLS: 6 /HPF
ESTIMATED AVERAGE GLUCOSE: 120 MG/DL
GLUCOSE QUALITATIVE U: NEGATIVE MG/DL
GLUCOSE SERPL-MCNC: 85 MG/DL
HBA1C MFR BLD HPLC: 5.8 %
HBV SURFACE AB SERPL IA-ACNC: 5 MIU/ML
HBV SURFACE AG SER QL: NONREACTIVE
HCG SERPL-MCNC: 301 MIU/ML
HCT VFR BLD CALC: 36.3 %
HDLC SERPL-MCNC: 41 MG/DL
HGB BLD-MCNC: 11 G/DL
KETONES URINE: NEGATIVE MG/DL
LDLC SERPL CALC-MCNC: 62 MG/DL
LEUKOCYTE ESTERASE URINE: ABNORMAL
M TB IFN-G BLD-IMP: NEGATIVE
MCHC RBC-ENTMCNC: 20.8 PG
MCHC RBC-ENTMCNC: 30.3 GM/DL
MCV RBC AUTO: 68.8 FL
MEV IGG FLD QL IA: >300 AU/ML
MEV IGG+IGM SER-IMP: POSITIVE
MICROSCOPIC-UA: NORMAL
MUV AB SER-ACNC: NEGATIVE
MUV IGG SER QL IA: 8.9 AU/ML
NITRITE URINE: NEGATIVE
NONHDLC SERPL-MCNC: 75 MG/DL
PH URINE: 7
PLATELET # BLD AUTO: 367 K/UL
POTASSIUM SERPL-SCNC: 4.1 MMOL/L
PROT SERPL-MCNC: 7.4 G/DL
PROTEIN URINE: NORMAL MG/DL
QUANTIFERON TB PLUS MITOGEN MINUS NIL: >10 IU/ML
QUANTIFERON TB PLUS NIL: 0.04 IU/ML
QUANTIFERON TB PLUS TB1 MINUS NIL: 0.01 IU/ML
QUANTIFERON TB PLUS TB2 MINUS NIL: 0 IU/ML
RBC # BLD: 5.28 M/UL
RBC # FLD: 16.5 %
RED BLOOD CELLS URINE: 1 /HPF
REVIEW: NORMAL
RUBV IGG FLD-ACNC: 2.1 INDEX
RUBV IGG SER-IMP: POSITIVE
SODIUM SERPL-SCNC: 138 MMOL/L
SPECIFIC GRAVITY URINE: 1.02
TRIGL SERPL-MCNC: 57 MG/DL
UROBILINOGEN URINE: 0.2 MG/DL
VZV AB TITR SER: POSITIVE
VZV IGG SER IF-ACNC: 1990 INDEX
WBC # FLD AUTO: 10.61 K/UL
WHITE BLOOD CELLS URINE: 1 /HPF

## 2024-06-14 PROCEDURE — 99443: CPT

## 2024-06-18 ENCOUNTER — TRANSCRIPTION ENCOUNTER (OUTPATIENT)
Age: 39
End: 2024-06-18

## 2024-06-27 ENCOUNTER — APPOINTMENT (OUTPATIENT)
Dept: OBGYN | Facility: CLINIC | Age: 39
End: 2024-06-27
Payer: COMMERCIAL

## 2024-06-27 ENCOUNTER — APPOINTMENT (OUTPATIENT)
Dept: ANTEPARTUM | Facility: CLINIC | Age: 39
End: 2024-06-27

## 2024-06-27 VITALS
HEIGHT: 61 IN | SYSTOLIC BLOOD PRESSURE: 117 MMHG | DIASTOLIC BLOOD PRESSURE: 79 MMHG | BODY MASS INDEX: 26.81 KG/M2 | WEIGHT: 142 LBS

## 2024-06-27 DIAGNOSIS — N92.6 IRREGULAR MENSTRUATION, UNSPECIFIED: ICD-10-CM

## 2024-06-27 PROCEDURE — 99214 OFFICE O/P EST MOD 30 MIN: CPT

## 2024-06-27 PROCEDURE — 36415 COLL VENOUS BLD VENIPUNCTURE: CPT

## 2024-06-28 LAB
ABO + RH PNL BLD: NORMAL
APPEARANCE: CLEAR
BACTERIA: NEGATIVE /HPF
BILIRUBIN URINE: NEGATIVE
BLOOD URINE: NEGATIVE
C TRACH RRNA SPEC QL NAA+PROBE: NOT DETECTED
CAST: 0 /LPF
COLOR: YELLOW
EPITHELIAL CELLS: 1 /HPF
ESTIMATED AVERAGE GLUCOSE: 117 MG/DL
GLUCOSE QUALITATIVE U: NEGATIVE MG/DL
GLUCOSE SERPL-MCNC: 83 MG/DL
HAV IGM SER QL: NONREACTIVE
HBA1C MFR BLD HPLC: 5.7 %
HBV CORE IGM SER QL: NONREACTIVE
HBV SURFACE AG SER QL: NONREACTIVE
HCT VFR BLD CALC: 37.6 %
HCV AB SER QL: NONREACTIVE
HCV S/CO RATIO: 0.07 S/CO
HGB A MFR BLD: 97.7 %
HGB A2 MFR BLD: 2.3 %
HGB BLD-MCNC: 10.9 G/DL
HGB FRACT BLD-IMP: NORMAL
HIV1+2 AB SPEC QL IA.RAPID: NONREACTIVE
KETONES URINE: NEGATIVE MG/DL
LEUKOCYTE ESTERASE URINE: ABNORMAL
MCHC RBC-ENTMCNC: 20.5 PG
MCHC RBC-ENTMCNC: 29 GM/DL
MCV RBC AUTO: 70.8 FL
MEV IGG FLD QL IA: >300 AU/ML
MEV IGG+IGM SER-IMP: POSITIVE
MICROSCOPIC-UA: NORMAL
MUV AB SER-ACNC: NEGATIVE
MUV IGG SER QL IA: 8.59 AU/ML
N GONORRHOEA RRNA SPEC QL NAA+PROBE: NOT DETECTED
NITRITE URINE: NEGATIVE
PH URINE: 7
PLATELET # BLD AUTO: 453 K/UL
PROTEIN URINE: NEGATIVE MG/DL
RBC # BLD: 5.31 M/UL
RBC # FLD: 16.5 %
RED BLOOD CELLS URINE: 0 /HPF
REVIEW: NORMAL
RUBV IGG FLD-ACNC: 2.1 INDEX
RUBV IGG SER-IMP: POSITIVE
SOURCE AMPLIFICATION: NORMAL
SPECIFIC GRAVITY URINE: 1.01
T PALLIDUM AB SER QL IA: NEGATIVE
UROBILINOGEN URINE: 0.2 MG/DL
WBC # FLD AUTO: 13.33 K/UL
WHITE BLOOD CELLS URINE: 2 /HPF

## 2024-06-29 LAB
BACTERIA UR CULT: NORMAL
LEAD BLD-MCNC: <1 UG/DL

## 2024-06-30 LAB
M TB IFN-G BLD-IMP: NEGATIVE
QUANTIFERON TB PLUS MITOGEN MINUS NIL: >10 IU/ML
QUANTIFERON TB PLUS NIL: 0.04 IU/ML
QUANTIFERON TB PLUS TB1 MINUS NIL: 0 IU/ML
QUANTIFERON TB PLUS TB2 MINUS NIL: 0.01 IU/ML

## 2024-07-02 LAB — FMR1 GENE MUT ANL BLD/T: NORMAL

## 2024-07-07 PROBLEM — O99.019 ANEMIA IN PREGNANCY: Status: ACTIVE | Noted: 2024-07-07

## 2024-07-15 ENCOUNTER — ASOB RESULT (OUTPATIENT)
Age: 39
End: 2024-07-15

## 2024-07-15 ENCOUNTER — APPOINTMENT (OUTPATIENT)
Dept: OBGYN | Facility: CLINIC | Age: 39
End: 2024-07-15
Payer: COMMERCIAL

## 2024-07-15 ENCOUNTER — APPOINTMENT (OUTPATIENT)
Dept: ANTEPARTUM | Facility: CLINIC | Age: 39
End: 2024-07-15
Payer: COMMERCIAL

## 2024-07-15 VITALS
SYSTOLIC BLOOD PRESSURE: 112 MMHG | DIASTOLIC BLOOD PRESSURE: 70 MMHG | HEIGHT: 61 IN | BODY MASS INDEX: 26.06 KG/M2 | WEIGHT: 138 LBS

## 2024-07-15 PROCEDURE — 76801 OB US < 14 WKS SINGLE FETUS: CPT

## 2024-07-15 PROCEDURE — 0501F PRENATAL FLOW SHEET: CPT

## 2024-08-05 ENCOUNTER — APPOINTMENT (OUTPATIENT)
Dept: MATERNAL FETAL MEDICINE | Facility: CLINIC | Age: 39
End: 2024-08-05

## 2024-08-05 PROCEDURE — 99214 OFFICE O/P EST MOD 30 MIN: CPT | Mod: 95

## 2024-08-08 ENCOUNTER — APPOINTMENT (OUTPATIENT)
Dept: OBGYN | Facility: CLINIC | Age: 39
End: 2024-08-08

## 2024-08-08 ENCOUNTER — APPOINTMENT (OUTPATIENT)
Dept: ANTEPARTUM | Facility: CLINIC | Age: 39
End: 2024-08-08

## 2024-08-08 PROBLEM — Z3A.12 12 WEEKS GESTATION OF PREGNANCY: Status: ACTIVE | Noted: 2024-08-08

## 2024-08-08 PROCEDURE — 76813 OB US NUCHAL MEAS 1 GEST: CPT

## 2024-08-08 PROCEDURE — 76801 OB US < 14 WKS SINGLE FETUS: CPT

## 2024-08-08 PROCEDURE — 36415 COLL VENOUS BLD VENIPUNCTURE: CPT

## 2024-08-08 PROCEDURE — 0502F SUBSEQUENT PRENATAL CARE: CPT

## 2024-09-04 ENCOUNTER — APPOINTMENT (OUTPATIENT)
Dept: OBGYN | Facility: CLINIC | Age: 39
End: 2024-09-04
Payer: COMMERCIAL

## 2024-09-04 VITALS
SYSTOLIC BLOOD PRESSURE: 101 MMHG | WEIGHT: 138 LBS | HEIGHT: 61 IN | DIASTOLIC BLOOD PRESSURE: 58 MMHG | BODY MASS INDEX: 26.06 KG/M2

## 2024-09-04 DIAGNOSIS — Z3A.16 16 WEEKS GESTATION OF PREGNANCY: ICD-10-CM

## 2024-09-04 PROCEDURE — 0502F SUBSEQUENT PRENATAL CARE: CPT

## 2024-09-05 LAB
AF-AFP DISCLAIMER: NORMAL
AFP  MOM: 1.18
AFP CONCENTRATION: 41.7 NG/ML
AFP INTERPRETATION: NORMAL
AFP MOM CUT-OFF: 2.5
AFP PERCENTILE: 69.2
AFP SCREENING RESULT: NORMAL
AFTER SCREENING RISK OPEN SPINA BIFIDA: NORMAL
BEFORE SCREENING RISK OPEN SPINA BIFIDA: NORMAL
CARBAMAZEPINE?: NO
CURRENT SMOKER: NORMAL
ESTIMATED DUE DATE: NORMAL
EXTREME ANALYTE ALERT: NO
FAMILY HISTORY OPEN SPINA BIFIDA: NORMAL
GESTATIONAL  AGE: NORMAL
GESTATIONAL AGE METHOD: NORMAL
INSULIN DEPEND DIABETES: NORMAL
MATERNAL WGT: 138
MULTIPLE PREGNANCY STATUS: NORMAL
RACE/ETHNICITY: NORMAL
VALPROIC ACID?: NORMAL

## 2024-09-06 ENCOUNTER — APPOINTMENT (OUTPATIENT)
Dept: INTERNAL MEDICINE | Facility: CLINIC | Age: 39
End: 2024-09-06

## 2024-10-03 ENCOUNTER — APPOINTMENT (OUTPATIENT)
Dept: OBGYN | Facility: CLINIC | Age: 39
End: 2024-10-03
Payer: COMMERCIAL

## 2024-10-03 ENCOUNTER — APPOINTMENT (OUTPATIENT)
Dept: ANTEPARTUM | Facility: CLINIC | Age: 39
End: 2024-10-03

## 2024-10-03 VITALS
SYSTOLIC BLOOD PRESSURE: 115 MMHG | HEIGHT: 61 IN | BODY MASS INDEX: 26.24 KG/M2 | WEIGHT: 139 LBS | DIASTOLIC BLOOD PRESSURE: 76 MMHG

## 2024-10-03 PROCEDURE — 0502F SUBSEQUENT PRENATAL CARE: CPT

## 2024-10-03 PROCEDURE — 76811 OB US DETAILED SNGL FETUS: CPT

## 2024-11-05 ENCOUNTER — APPOINTMENT (OUTPATIENT)
Dept: ANTEPARTUM | Facility: CLINIC | Age: 39
End: 2024-11-05

## 2024-11-05 ENCOUNTER — ASOB RESULT (OUTPATIENT)
Age: 39
End: 2024-11-05

## 2024-11-05 ENCOUNTER — APPOINTMENT (OUTPATIENT)
Dept: OBGYN | Facility: CLINIC | Age: 39
End: 2024-11-05

## 2024-11-05 VITALS
WEIGHT: 142 LBS | SYSTOLIC BLOOD PRESSURE: 112 MMHG | HEIGHT: 61 IN | BODY MASS INDEX: 26.81 KG/M2 | DIASTOLIC BLOOD PRESSURE: 71 MMHG

## 2024-11-05 DIAGNOSIS — Z3A.25 25 WEEKS GESTATION OF PREGNANCY: ICD-10-CM

## 2024-11-05 PROCEDURE — 36415 COLL VENOUS BLD VENIPUNCTURE: CPT

## 2024-11-05 PROCEDURE — 76816 OB US FOLLOW-UP PER FETUS: CPT

## 2024-11-05 PROCEDURE — 0502F SUBSEQUENT PRENATAL CARE: CPT

## 2024-11-06 LAB
GLUCOSE 1H P 50 G GLC PO SERPL-MCNC: 86 MG/DL
HCT VFR BLD CALC: 36 %
HGB BLD-MCNC: 10.7 G/DL
MCHC RBC-ENTMCNC: 21.9 PG
MCHC RBC-ENTMCNC: 29.7 G/DL
MCV RBC AUTO: 73.6 FL
PLATELET # BLD AUTO: 366 K/UL
RBC # BLD: 4.89 M/UL
RBC # FLD: 16.4 %
WBC # FLD AUTO: 14.23 K/UL

## 2024-11-07 ENCOUNTER — APPOINTMENT (OUTPATIENT)
Dept: OBGYN | Facility: CLINIC | Age: 39
End: 2024-11-07

## 2024-11-27 ENCOUNTER — APPOINTMENT (OUTPATIENT)
Dept: OBGYN | Facility: CLINIC | Age: 39
End: 2024-11-27
Payer: COMMERCIAL

## 2024-11-27 VITALS — DIASTOLIC BLOOD PRESSURE: 70 MMHG | WEIGHT: 149 LBS | SYSTOLIC BLOOD PRESSURE: 110 MMHG | BODY MASS INDEX: 28.15 KG/M2

## 2024-11-27 PROCEDURE — 0502F SUBSEQUENT PRENATAL CARE: CPT

## 2024-12-10 ENCOUNTER — EMERGENCY (EMERGENCY)
Facility: HOSPITAL | Age: 39
LOS: 1 days | Discharge: STILL ADMITTED OR ADMINISTRATIVE | End: 2024-12-10
Admitting: EMERGENCY MEDICINE

## 2024-12-10 ENCOUNTER — INPATIENT (INPATIENT)
Facility: HOSPITAL | Age: 39
LOS: 0 days | Discharge: HOME | End: 2024-12-11
Attending: OBSTETRICS & GYNECOLOGY | Admitting: OBSTETRICS & GYNECOLOGY

## 2024-12-10 ENCOUNTER — APPOINTMENT (OUTPATIENT)
Dept: ANTEPARTUM | Facility: CLINIC | Age: 39
End: 2024-12-10
Payer: COMMERCIAL

## 2024-12-10 ENCOUNTER — NON-APPOINTMENT (OUTPATIENT)
Age: 39
End: 2024-12-10

## 2024-12-10 ENCOUNTER — ASOB RESULT (OUTPATIENT)
Age: 39
End: 2024-12-10

## 2024-12-10 VITALS
RESPIRATION RATE: 18 BRPM | TEMPERATURE: 98 F | HEART RATE: 70 BPM | SYSTOLIC BLOOD PRESSURE: 102 MMHG | OXYGEN SATURATION: 100 % | DIASTOLIC BLOOD PRESSURE: 60 MMHG

## 2024-12-10 VITALS
DIASTOLIC BLOOD PRESSURE: 58 MMHG | SYSTOLIC BLOOD PRESSURE: 105 MMHG | TEMPERATURE: 98 F | RESPIRATION RATE: 16 BRPM | HEART RATE: 61 BPM

## 2024-12-10 DIAGNOSIS — O26.899 OTHER SPECIFIED PREGNANCY RELATED CONDITIONS, UNSPECIFIED TRIMESTER: ICD-10-CM

## 2024-12-10 LAB
BASOPHILS # BLD AUTO: 0.04 K/UL — SIGNIFICANT CHANGE UP (ref 0–0.2)
BASOPHILS NFR BLD AUTO: 0.3 % — SIGNIFICANT CHANGE UP (ref 0–2)
BLD GP AB SCN SERPL QL: NEGATIVE — SIGNIFICANT CHANGE UP
EOSINOPHIL # BLD AUTO: 0.08 K/UL — SIGNIFICANT CHANGE UP (ref 0–0.5)
EOSINOPHIL NFR BLD AUTO: 0.5 % — SIGNIFICANT CHANGE UP (ref 0–6)
HCT VFR BLD CALC: 33 % — LOW (ref 34.5–45)
HGB BLD-MCNC: 10.2 G/DL — LOW (ref 11.5–15.5)
IANC: 10.24 K/UL — HIGH (ref 1.8–7.4)
IMM GRANULOCYTES NFR BLD AUTO: 1.1 % — HIGH (ref 0–0.9)
LYMPHOCYTES # BLD AUTO: 21 % — SIGNIFICANT CHANGE UP (ref 13–44)
LYMPHOCYTES # BLD AUTO: 3.19 K/UL — SIGNIFICANT CHANGE UP (ref 1–3.3)
MCHC RBC-ENTMCNC: 21.7 PG — LOW (ref 27–34)
MCHC RBC-ENTMCNC: 30.9 G/DL — LOW (ref 32–36)
MCV RBC AUTO: 70.2 FL — LOW (ref 80–100)
MONOCYTES # BLD AUTO: 1.45 K/UL — HIGH (ref 0–0.9)
MONOCYTES NFR BLD AUTO: 9.6 % — SIGNIFICANT CHANGE UP (ref 2–14)
NEUTROPHILS # BLD AUTO: 10.24 K/UL — HIGH (ref 1.8–7.4)
NEUTROPHILS NFR BLD AUTO: 67.5 % — SIGNIFICANT CHANGE UP (ref 43–77)
NRBC # BLD: 0 /100 WBCS — SIGNIFICANT CHANGE UP (ref 0–0)
NRBC # FLD: 0 K/UL — SIGNIFICANT CHANGE UP (ref 0–0)
PLATELET # BLD AUTO: 404 K/UL — HIGH (ref 150–400)
RBC # BLD: 4.7 M/UL — SIGNIFICANT CHANGE UP (ref 3.8–5.2)
RBC # FLD: 14.6 % — HIGH (ref 10.3–14.5)
RH IG SCN BLD-IMP: POSITIVE — SIGNIFICANT CHANGE UP
RH IG SCN BLD-IMP: POSITIVE — SIGNIFICANT CHANGE UP
T PALLIDUM AB TITR SER: NEGATIVE — SIGNIFICANT CHANGE UP
WBC # BLD: 15.16 K/UL — HIGH (ref 3.8–10.5)
WBC # FLD AUTO: 15.16 K/UL — HIGH (ref 3.8–10.5)

## 2024-12-10 PROCEDURE — 76819 FETAL BIOPHYS PROFIL W/O NST: CPT | Mod: 26

## 2024-12-10 PROCEDURE — L9996: CPT

## 2024-12-10 PROCEDURE — 76815 OB US LIMITED FETUS(S): CPT | Mod: 26

## 2024-12-10 RX ORDER — INFLUENZA VIRUS VACCINE 15; 15; 15; 15 UG/.5ML; UG/.5ML; UG/.5ML; UG/.5ML
0.5 SUSPENSION INTRAMUSCULAR ONCE
Refills: 0 | Status: DISCONTINUED | OUTPATIENT
Start: 2024-12-10 | End: 2024-12-11

## 2024-12-10 RX ORDER — 0.9 % SODIUM CHLORIDE 0.9 %
1000 INTRAVENOUS SOLUTION INTRAVENOUS
Refills: 0 | Status: DISCONTINUED | OUTPATIENT
Start: 2024-12-10 | End: 2024-12-11

## 2024-12-10 RX ORDER — 0.9 % SODIUM CHLORIDE 0.9 %
1000 INTRAVENOUS SOLUTION INTRAVENOUS ONCE
Refills: 0 | Status: COMPLETED | OUTPATIENT
Start: 2024-12-10 | End: 2024-12-10

## 2024-12-10 RX ADMIN — Medication 125 MILLILITER(S): at 17:58

## 2024-12-10 RX ADMIN — Medication 1000 MILLILITER(S): at 16:45

## 2024-12-10 NOTE — OB RN TRIAGE NOTE - NS_DISPOSITION_OBGYN_ALL_OB
Spiritual Plan of Care    Pt Name: Eunice Rodas  Pt : 1934  Date: January 15, 2019    Referral source: Family  Reason for visit: Spiritual/Muslim/Ritual Support, Other (Comment)(To do \"last rites\")  Visited with: Patient, Family/Friend  Patient Assessment: Coping , Relies on steff, Support system  Patient  Intervention: Prayer  Family/Friend Assessment: Coping, Grief , Relies on steff  Family/Friend  Intervention: Empathic Listening, Grief Support, Prayer  Spiritual Plan of Care: No plan for follow up at this time    Responded to request to \"do Last Rites\" for pt \"who is dying.\" Pt unresponsive and three family members (two sisters and brother in-law) by bed side. Family was led by  in a prayer of commendation with special blessing with holy water. Family grieving with no issue.    Continue to Observe Admit to Labor and Delivery

## 2024-12-10 NOTE — OB PROVIDER H&P - ASSESSMENT
This is a 39 year old  at 30.3 weeks admitted for observation for nonreactive NST    Plan discussed with Dr Loera  admit for observation for nonreactive NST  continuous monitoring  routine orders      Risks, benefits, alternatives, and possible complications have been discussed in detail with the patient in her native language. Pre-admission, admission, and post admission procedures and expectations were discussed in detail. All questions answered, all appropriate hospital consents were signed. Anticipate normal vaginal delivery.    Informed consent was obtained. The following was discussed:    - Induction/augmentation of labor: use of medication and/or cook balloon to begin or enhance labor    - Obstetrical management including internal fetal/contraction monitoring    - Normal vaginal delivery    - Possible  section

## 2024-12-10 NOTE — OB PROVIDER TRIAGE NOTE - NSICDXPASTMEDICALHX_GEN_ALL_CORE_FT
Preop instructions: NPO after midnight or 8 hours prior to procedure time, shower instructions, directions, leave all valuables at home, medication instructions for PM prior & am of procedure explained. Sister stated an understanding.    Sister reports patient has issues with PONV with past anesthesia or sedation.    Sister does not know arrival time. Explained that this information comes from the surgeons office and if they have not heard from them by 3pm to call office. Sister stated an understanding.   PAST MEDICAL HISTORY:  Anemia

## 2024-12-10 NOTE — OB PROVIDER H&P - HISTORY OF PRESENT ILLNESS
This is a 39 year old  at 30.3 weeks gestational age presents with complaints of decreased fetal movement since last night. Pt reports feeling movement however less intense than usual. Pt denies LOF, VB or contractions.     PNC: Dr Moran- Abhay    AP course  - FA- bilateral mild pyelectasis 7mm  - Anemia    HIE reviewed  () vtx, anterior placenta, mvp 6.47, efw 806g 38%

## 2024-12-10 NOTE — CHART NOTE - NSCHARTNOTEFT_GEN_A_CORE
OB Attending    Patient seen at bedside. 30 weeks pregnant admitted with decreased fetal movement noted to have spontaneous decels x2 upon presentation.   Patient with prolonged monitoring and cat I tracing x10 hours but had spontaneous decel x1 at 9:30  Decision made to continue current care at present  Plan of care discussed with patient and partner  Case also discussed with MFM - will consider betamethasone if recurrent decels x2 within one hour.  Full MFM consult with nancy in AM    DEEPIKA Ojeda MD

## 2024-12-10 NOTE — OB PROVIDER H&P - NSHPPHYSICALEXAM_GEN_ALL_CORE
Vital Signs Last 24 Hrs  T(C): 36.9 (10 Dec 2024 08:24), Max: 36.9 (10 Dec 2024 08:07)  T(F): 98.42 (10 Dec 2024 08:24), Max: 98.42 (10 Dec 2024 08:24)  HR: 59 (10 Dec 2024 09:26) (57 - 70)  BP: 102/64 (10 Dec 2024 09:26) (99/55 - 105/58)  BP(mean): --  RR: 16 (10 Dec 2024 08:24) (16 - 18)  SpO2: 100% (10 Dec 2024 07:36) (100% - 100%)    A&O x3  CTAB  abdomen: gravid, soft, nontender  TAS: complete breech, anterior placenta, bpp 8/8, mika 18.89, m mode 138bpm, images saved in ASOB  NST:" 135 baseline, moderate variability, decel x 2 minutes initially when NST applied, then 2 spontaneous decelerations at 10:10 and 10:40 nonreactive NST

## 2024-12-10 NOTE — ED ADULT TRIAGE NOTE - CHIEF COMPLAINT QUOTE
Pt , 30 weeks pregnant c/o decreased fetal movement since last night. Denies abd pain. No Hx. Per L&D, provider ronn Rosa to be seen on L&D.

## 2024-12-10 NOTE — PROVIDER CONTACT NOTE (OTHER) - ASSESSMENT
bpm with moderate variability, + accelerations, no decels. pt denies cramping or ctx. denies ROM or vag bleeding. no OB complaints. pt reports + fetal movement

## 2024-12-10 NOTE — OB RN PATIENT PROFILE - FUNCTIONAL ASSESSMENT - BASIC MOBILITY 6.
4-calculated by average/Not able to assess (calculate score using [facility] averaging method)

## 2024-12-10 NOTE — OB PROVIDER H&P - NSLOWPPHRISK_OBGYN_A_OB
No previous uterine incision/[place] Pregnancy/Less than or equal to 4 previous vaginal births/No known bleeding disorder/No history of postpartum hemorrhage/No other PPH risks indicated

## 2024-12-10 NOTE — OB PROVIDER H&P - NSICDXPASTSURGICALHX_GEN_ALL_CORE_FT
Reviewed all results and necessity for follow up. Counseled on red flags and to return for them.  Patient appears well on discharge.
PAST SURGICAL HISTORY:  No significant past surgical history

## 2024-12-10 NOTE — OB PROVIDER TRIAGE NOTE - HISTORY OF PRESENT ILLNESS
This is a 39 year old  at 30.3 weeks gestational age presents with complaints of decreased fetal movement since last night. Pt reports feeling movement however less intense than usual. Pt denies LOF, VB or contractions.     PNC: Dr Moran- Abhay    AP course  - FA- bilateral mild pyelectasis 7mm  - Anemia    HIE reviewed  () vtx, anterior placenta, mvp 6.47, efw 806g 38%
Improved

## 2024-12-10 NOTE — OB RN PATIENT PROFILE - FALL HARM RISK - UNIVERSAL INTERVENTIONS
Bed in lowest position, wheels locked, appropriate side rails in place/Call bell, personal items and telephone in reach/Instruct patient to call for assistance before getting out of bed or chair/Non-slip footwear when patient is out of bed/[place] to call system/Physically safe environment - no spills, clutter or unnecessary equipment/Purposeful Proactive Rounding/Room/bathroom lighting operational, light cord in reach

## 2024-12-10 NOTE — OB PROVIDER TRIAGE NOTE - NSOBPROVIDERNOTE_OBGYN_ALL_OB_FT
fetus now in complete breech presentation (previously vtx at last sonogram 1 month ago)    Pt now reporting + GFM    Will continue NST x 20-30 minutes patient has spontaneous decel at initiation of NST at 0834 fetus now in complete breech presentation (previously vtx at last sonogram 1 month ago)    Pt now reporting + GFM    Will continue NST x 20-30 minutes patient has spontaneous decel at initiation of NST at 0834    spontaneous deceleration at 1010 and 1040    PLan discussed with Dr Loera  admit for observation

## 2024-12-10 NOTE — OB RN PATIENT PROFILE - FUNCTIONAL ASSESSMENT - DAILY ACTIVITY 3.
4 = No assist / stand by assistance
Urinalysis Basic - ( 30 May 2021 00:46 )    Color: Colorless / Appearance: Clear / S.008 / pH: x  Gluc: x / Ketone: Negative  / Bili: Negative / Urobili: <2 mg/dL   Blood: x / Protein: Negative / Nitrite: Negative   Leuk Esterase: Negative / RBC: 10-20 /HPF / WBC 1 /HPF   Sq Epi: x / Non Sq Epi: 4 /HPF / Bacteria: Negative                            10.7   9.67  )-----------( 212      ( 30 May 2021 01:05 )             32.3

## 2024-12-10 NOTE — OB PROVIDER TRIAGE NOTE - NSHPPHYSICALEXAM_GEN_ALL_CORE
Vital Signs Last 24 Hrs  T(C): 36.9 (10 Dec 2024 08:24), Max: 36.9 (10 Dec 2024 08:07)  T(F): 98.42 (10 Dec 2024 08:24), Max: 98.42 (10 Dec 2024 08:24)  HR: 59 (10 Dec 2024 09:26) (57 - 70)  BP: 102/64 (10 Dec 2024 09:26) (99/55 - 105/58)  BP(mean): --  RR: 16 (10 Dec 2024 08:24) (16 - 18)  SpO2: 100% (10 Dec 2024 07:36) (100% - 100%)    A&O x3  CTAB  abdomen: gravid, soft, nontender  TAS: complete breech, anterior placenta, bpp 8/8, mika 18.89, m mode 138bpm, images saved in ASOB  NST:" 135 baseline, moderate variability, decel x 2 minutes initially when NST applied, however no continued decels after that, no contractions, reactive NST

## 2024-12-11 ENCOUNTER — ASOB RESULT (OUTPATIENT)
Age: 39
End: 2024-12-11

## 2024-12-11 ENCOUNTER — TRANSCRIPTION ENCOUNTER (OUTPATIENT)
Age: 39
End: 2024-12-11

## 2024-12-11 ENCOUNTER — APPOINTMENT (OUTPATIENT)
Dept: ANTEPARTUM | Facility: CLINIC | Age: 39
End: 2024-12-11
Payer: COMMERCIAL

## 2024-12-11 VITALS
SYSTOLIC BLOOD PRESSURE: 101 MMHG | RESPIRATION RATE: 16 BRPM | HEART RATE: 64 BPM | DIASTOLIC BLOOD PRESSURE: 59 MMHG | TEMPERATURE: 98 F

## 2024-12-11 DIAGNOSIS — Z04.9 ENCOUNTER FOR EXAMINATION AND OBSERVATION FOR UNSPECIFIED REASON: ICD-10-CM

## 2024-12-11 PROCEDURE — 76816 OB US FOLLOW-UP PER FETUS: CPT | Mod: 26

## 2024-12-11 RX ORDER — MULTIVIT WITH MINERALS/LUTEIN
1 TABLET ORAL
Qty: 0 | Refills: 0 | DISCHARGE
Start: 2024-12-11

## 2024-12-11 RX ORDER — FERROUS SULFATE 325(65) MG
325 TABLET ORAL DAILY
Refills: 0 | Status: DISCONTINUED | OUTPATIENT
Start: 2024-12-11 | End: 2024-12-11

## 2024-12-11 RX ORDER — GLUCOSAMINE SULFATE DIPOT CHLR 500 MG
1 CAPSULE ORAL
Qty: 0 | Refills: 0 | DISCHARGE
Start: 2024-12-11

## 2024-12-11 RX ORDER — .BETA.-CAROTENE, SODIUM ACETATE, ASCORBIC ACID, CHOLECALCIFEROL, .ALPHA.-TOCOPHEROL ACETATE, DL-, THIAMINE MONONITRATE, RIBOFLAVIN, NIACINAMIDE, PYRIDOXINE HYDROCHLORIDE, FOLIC ACID, CYANOCOBALAMIN, CALCIUM CARBONATE, FERROUS FUMARATE, ZINC OXIDE AND CUPRIC OXIDE 2000; 2000; 120; 400; 22; 1.84; 3; 20; 10; 1; 12; 200; 27; 25; 2 [IU]/1; [IU]/1; MG/1; [IU]/1; MG/1; MG/1; MG/1; MG/1; MG/1; MG/1; UG/1; MG/1; MG/1; MG/1; MG/1
1 TABLET ORAL DAILY
Refills: 0 | Status: DISCONTINUED | OUTPATIENT
Start: 2024-12-11 | End: 2024-12-11

## 2024-12-11 RX ORDER — GLUCOSAMINE SULFATE DIPOT CHLR 500 MG
1 CAPSULE ORAL DAILY
Refills: 0 | Status: DISCONTINUED | OUTPATIENT
Start: 2024-12-11 | End: 2024-12-11

## 2024-12-11 RX ORDER — .BETA.-CAROTENE, SODIUM ACETATE, ASCORBIC ACID, CHOLECALCIFEROL, .ALPHA.-TOCOPHEROL ACETATE, DL-, THIAMINE MONONITRATE, RIBOFLAVIN, NIACINAMIDE, PYRIDOXINE HYDROCHLORIDE, FOLIC ACID, CYANOCOBALAMIN, CALCIUM CARBONATE, FERROUS FUMARATE, ZINC OXIDE AND CUPRIC OXIDE 2000; 2000; 120; 400; 22; 1.84; 3; 20; 10; 1; 12; 200; 27; 25; 2 [IU]/1; [IU]/1; MG/1; [IU]/1; MG/1; MG/1; MG/1; MG/1; MG/1; MG/1; UG/1; MG/1; MG/1; MG/1; MG/1
0 TABLET ORAL
Refills: 0 | DISCHARGE

## 2024-12-11 RX ORDER — MULTIVIT WITH MINERALS/LUTEIN
500 TABLET ORAL DAILY
Refills: 0 | Status: DISCONTINUED | OUTPATIENT
Start: 2024-12-11 | End: 2024-12-11

## 2024-12-11 RX ORDER — FERROUS SULFATE 325(65) MG
0 TABLET ORAL
Refills: 0 | DISCHARGE

## 2024-12-11 RX ORDER — FERROUS SULFATE 325(65) MG
1 TABLET ORAL
Qty: 0 | Refills: 0 | DISCHARGE
Start: 2024-12-11

## 2024-12-11 RX ORDER — .BETA.-CAROTENE, SODIUM ACETATE, ASCORBIC ACID, CHOLECALCIFEROL, .ALPHA.-TOCOPHEROL ACETATE, DL-, THIAMINE MONONITRATE, RIBOFLAVIN, NIACINAMIDE, PYRIDOXINE HYDROCHLORIDE, FOLIC ACID, CYANOCOBALAMIN, CALCIUM CARBONATE, FERROUS FUMARATE, ZINC OXIDE AND CUPRIC OXIDE 2000; 2000; 120; 400; 22; 1.84; 3; 20; 10; 1; 12; 200; 27; 25; 2 [IU]/1; [IU]/1; MG/1; [IU]/1; MG/1; MG/1; MG/1; MG/1; MG/1; MG/1; UG/1; MG/1; MG/1; MG/1; MG/1
1 TABLET ORAL
Qty: 0 | Refills: 0 | DISCHARGE
Start: 2024-12-11

## 2024-12-11 RX ADMIN — Medication 125 MILLILITER(S): at 06:36

## 2024-12-11 NOTE — DISCHARGE NOTE ANTEPARTUM - NS MD DC FALL RISK RISK
For information on Fall & Injury Prevention, visit: [email or web address] OR  [email or web address] OR  https://www.cdc.gov/steadi/patient.html

## 2024-12-11 NOTE — DISCHARGE NOTE ANTEPARTUM - ADDITIONAL INSTRUCTIONS
- Please follow up with your obstetrician this week.   - Please return for decreased/no fetal movement, vaginal bleeding similar to that of a period, leaking/gush of fluid, regular contractions occurring 4-5 minutes for one hour or requiring pain medication.

## 2024-12-11 NOTE — DISCHARGE NOTE ANTEPARTUM - MEDICATION SUMMARY - MEDICATIONS TO TAKE
I will START or STAY ON the medications listed below when I get home from the hospital:    aspirin 81 mg oral tablet, chewable  -- 1 tab(s) by mouth once a day  -- Indication: For Other pregnancy-related conditions, antepartum    Prenatal Multivitamins with Folic Acid 1 mg oral tablet  -- 1 tab(s) by mouth once a day  -- Indication: For Other pregnancy-related conditions, antepartum    ferrous sulfate 325 mg (65 mg elemental iron) oral tablet  -- 1 tab(s) by mouth once a day  -- Indication: For Anemia    folic acid 1 mg oral tablet  -- 1 tab(s) by mouth once a day  -- Indication: For Pregnancy    ascorbic acid 500 mg oral tablet  -- 1 tab(s) by mouth once a day  -- Indication: For Anemia

## 2024-12-11 NOTE — DISCHARGE NOTE ANTEPARTUM - HOSPITAL COURSE
Ms. REY STERLING is a 38yo  at 30w4d p/w decr fm. Admitted to Antepartum service for prolonged fetal monitoring in setting of non-reassuring NST. BPP in triage 12/10 8/8, notable for breech presentation. Overnight NST overall reassuring. Pending ATU scan prior to discharge.     Cleared for discharge with Dr. Loya and MYA.

## 2024-12-11 NOTE — DISCHARGE NOTE ANTEPARTUM - PATIENT PORTAL LINK FT
You can access the FollowMyHealth Patient Portal offered by [facility] by registering at the following website: [email or web address]. By joining [facility]’s FollowMyHealth portal, you will also be able to view your health information using other applications (apps) compatible with our system.

## 2024-12-11 NOTE — PROGRESS NOTE ADULT - SUBJECTIVE AND OBJECTIVE BOX
PGY 3 Antepartum Note    No acute overnight events. Patient seen and examined at bedside in NAD. Denies any CTX, LOF or VB. Pt reports fetal movement is "a lot better" than time of admission.    T(F): 98.24 (03:57)  HR: 96 (03:57)  BP: 99/61 (03:57)  RR: --    EFM: 130/mod/+accels/occasional variable decels but overall reassuring  TOCO: uterine irritability    Gen: NAD  Cardio: clinically well perfused  Pulm: respiring comfortably on RA  Abd: gravid, nontender, no palpable contractions  Ext: no edema,  no calf tenderness  SVE: deferred    Medications:    lactated ringers Bolus: 1000 mL/Hr (12-10-24 @ 16:45)  lactated ringers.: 125 mL/Hr (12-11-24 @ 02:00),  125 mL/Hr (12-10-24 @ 16:41)      Labs:                        10.2   15.16 )-----------( 404      ( 10 Dec 2024 12:55 )             33.0

## 2024-12-11 NOTE — DISCHARGE NOTE ANTEPARTUM - FINANCIAL ASSISTANCE
[facility] provides services at a reduced cost to those who are determined to be eligible through [facility]’s financial assistance program. Information regarding [facility]’s financial assistance program can be found by going to [email or web address] or by calling [phone].

## 2024-12-11 NOTE — DISCHARGE NOTE ANTEPARTUM - CARE PLAN
1 Principal Discharge DX:	Decreased fetal movement  Assessment and plan of treatment:	- Patient to be discharged home with follow up and return precautions  - Stable for discharge.  - FU OB this week.

## 2024-12-11 NOTE — DISCHARGE NOTE ANTEPARTUM - CARE PROVIDER_API CALL
Sample-Jeannie Blank  Obstetrics and Gynecology  [address], [address]  [place] [address]  Phone: [phone]  Fax: [phone]  Follow Up Time:

## 2024-12-11 NOTE — PROGRESS NOTE ADULT - ASSESSMENT
A/P: 39y  at 30w4d presented with decreased fetal movement at 30w3d, admitted to Antepartum service for prolonged fetal monitoring in setting of non-reassuring NST. BPP 8/8 and notable for breech presentation. Overnight NST overall reassuring with occasional variable and/or late decelerations noted, however mod variability and accels present.    #NRNST  - C/w prolonged fetal monitoring at this time  - For MFM sono in AM    #Maternal well-being  - NPO, LR@125  - PNV  - bASA for PEC prevention  - PO Fe for anemia    #Fetal well-being  - f/u GBS ()  - BSUS (12/10): complete breech, ant, SHREYA 1.8cm, BPP 8/8  - Fetal alert for mild renal pyelectasis  - Monitoring as above  - Consider NICU consult if changes in maternal or fetal status    VTimmel PGY3

## 2024-12-11 NOTE — DISCHARGE NOTE ANTEPARTUM - PLAN OF CARE
- Patient to be discharged home with follow up and return precautions  - Stable for discharge.  - FU OB this week.

## 2024-12-12 LAB
GROUP B BETA STREP DNA (PCR): SIGNIFICANT CHANGE UP
SOURCE GROUP B STREP: SIGNIFICANT CHANGE UP

## 2024-12-16 ENCOUNTER — APPOINTMENT (OUTPATIENT)
Dept: ANTEPARTUM | Facility: CLINIC | Age: 39
End: 2024-12-16

## 2024-12-16 ENCOUNTER — APPOINTMENT (OUTPATIENT)
Dept: OBGYN | Facility: CLINIC | Age: 39
End: 2024-12-16
Payer: COMMERCIAL

## 2024-12-16 VITALS — WEIGHT: 150 LBS | BODY MASS INDEX: 28.34 KG/M2 | DIASTOLIC BLOOD PRESSURE: 70 MMHG | SYSTOLIC BLOOD PRESSURE: 107 MMHG

## 2024-12-16 PROCEDURE — 0502F SUBSEQUENT PRENATAL CARE: CPT

## 2024-12-27 ENCOUNTER — APPOINTMENT (OUTPATIENT)
Dept: OBGYN | Facility: CLINIC | Age: 39
End: 2024-12-27
Payer: COMMERCIAL

## 2024-12-27 VITALS
WEIGHT: 148 LBS | DIASTOLIC BLOOD PRESSURE: 68 MMHG | SYSTOLIC BLOOD PRESSURE: 115 MMHG | HEIGHT: 61 IN | BODY MASS INDEX: 27.94 KG/M2

## 2024-12-27 PROCEDURE — 0502F SUBSEQUENT PRENATAL CARE: CPT

## 2025-01-09 ENCOUNTER — APPOINTMENT (OUTPATIENT)
Dept: OBGYN | Facility: CLINIC | Age: 40
End: 2025-01-09
Payer: COMMERCIAL

## 2025-01-09 VITALS
DIASTOLIC BLOOD PRESSURE: 75 MMHG | WEIGHT: 150 LBS | HEIGHT: 61 IN | BODY MASS INDEX: 28.32 KG/M2 | SYSTOLIC BLOOD PRESSURE: 122 MMHG

## 2025-01-09 PROCEDURE — 0502F SUBSEQUENT PRENATAL CARE: CPT

## 2025-01-23 ENCOUNTER — APPOINTMENT (OUTPATIENT)
Dept: OBGYN | Facility: CLINIC | Age: 40
End: 2025-01-23

## 2025-01-23 VITALS
BODY MASS INDEX: 29.45 KG/M2 | WEIGHT: 156 LBS | HEIGHT: 61 IN | DIASTOLIC BLOOD PRESSURE: 76 MMHG | SYSTOLIC BLOOD PRESSURE: 112 MMHG

## 2025-01-23 DIAGNOSIS — Z23 ENCOUNTER FOR IMMUNIZATION: ICD-10-CM

## 2025-01-23 DIAGNOSIS — Z3A.36 36 WEEKS GESTATION OF PREGNANCY: ICD-10-CM

## 2025-01-23 PROCEDURE — 90715 TDAP VACCINE 7 YRS/> IM: CPT

## 2025-01-23 PROCEDURE — 36415 COLL VENOUS BLD VENIPUNCTURE: CPT

## 2025-01-23 PROCEDURE — 0502F SUBSEQUENT PRENATAL CARE: CPT

## 2025-01-23 PROCEDURE — 90471 IMMUNIZATION ADMIN: CPT

## 2025-01-24 LAB
FERRITIN SERPL-MCNC: 47 NG/ML
FOLATE SERPL-MCNC: >20 NG/ML
HCT VFR BLD CALC: 37.7 %
HGB BLD-MCNC: 11.4 G/DL
HIV1+2 AB SPEC QL IA.RAPID: NONREACTIVE
IRON SATN MFR SERPL: 21 %
IRON SERPL-MCNC: 104 UG/DL
MCHC RBC-ENTMCNC: 22.1 PG
MCHC RBC-ENTMCNC: 30.2 G/DL
MCV RBC AUTO: 73.2 FL
PLATELET # BLD AUTO: 304 K/UL
RBC # BLD: 5.15 M/UL
RBC # FLD: 15.6 %
T PALLIDUM AB SER QL IA: NEGATIVE
TIBC SERPL-MCNC: 506 UG/DL
UIBC SERPL-MCNC: 402 UG/DL
VIT B12 SERPL-MCNC: 251 PG/ML
WBC # FLD AUTO: 13.34 K/UL

## 2025-01-25 LAB
GP B STREP DNA SPEC QL NAA+PROBE: NOT DETECTED
SOURCE GBS: NORMAL

## 2025-01-30 ENCOUNTER — APPOINTMENT (OUTPATIENT)
Dept: OBGYN | Facility: CLINIC | Age: 40
End: 2025-01-30

## 2025-01-30 ENCOUNTER — NON-APPOINTMENT (OUTPATIENT)
Age: 40
End: 2025-01-30

## 2025-01-30 VITALS
HEIGHT: 61 IN | BODY MASS INDEX: 29.83 KG/M2 | SYSTOLIC BLOOD PRESSURE: 115 MMHG | DIASTOLIC BLOOD PRESSURE: 76 MMHG | WEIGHT: 158 LBS

## 2025-01-30 PROCEDURE — 0502F SUBSEQUENT PRENATAL CARE: CPT

## 2025-02-06 ENCOUNTER — APPOINTMENT (OUTPATIENT)
Dept: OBGYN | Facility: CLINIC | Age: 40
End: 2025-02-06
Payer: COMMERCIAL

## 2025-02-06 ENCOUNTER — APPOINTMENT (OUTPATIENT)
Dept: ANTEPARTUM | Facility: CLINIC | Age: 40
End: 2025-02-06

## 2025-02-06 VITALS
WEIGHT: 155 LBS | BODY MASS INDEX: 29.27 KG/M2 | SYSTOLIC BLOOD PRESSURE: 119 MMHG | DIASTOLIC BLOOD PRESSURE: 78 MMHG | HEIGHT: 61 IN

## 2025-02-06 PROCEDURE — 0502F SUBSEQUENT PRENATAL CARE: CPT

## 2025-02-06 PROCEDURE — 76819 FETAL BIOPHYS PROFIL W/O NST: CPT | Mod: 59

## 2025-02-06 PROCEDURE — 76816 OB US FOLLOW-UP PER FETUS: CPT

## 2025-02-13 ENCOUNTER — APPOINTMENT (OUTPATIENT)
Dept: OBGYN | Facility: CLINIC | Age: 40
End: 2025-02-13
Payer: COMMERCIAL

## 2025-02-13 VITALS
SYSTOLIC BLOOD PRESSURE: 123 MMHG | BODY MASS INDEX: 28.32 KG/M2 | DIASTOLIC BLOOD PRESSURE: 82 MMHG | WEIGHT: 150 LBS | HEIGHT: 61 IN

## 2025-02-13 PROCEDURE — 0502F SUBSEQUENT PRENATAL CARE: CPT

## 2025-02-14 ENCOUNTER — APPOINTMENT (OUTPATIENT)
Dept: PEDIATRICS | Facility: CLINIC | Age: 40
End: 2025-02-14
Payer: COMMERCIAL

## 2025-02-14 PROCEDURE — ZZZZZ: CPT

## 2025-02-15 ENCOUNTER — APPOINTMENT (OUTPATIENT)
Dept: ANTEPARTUM | Facility: CLINIC | Age: 40
End: 2025-02-15

## 2025-02-15 ENCOUNTER — INPATIENT (INPATIENT)
Facility: HOSPITAL | Age: 40
LOS: 4 days | Discharge: HOME | End: 2025-02-20
Attending: OBSTETRICS & GYNECOLOGY | Admitting: OBSTETRICS & GYNECOLOGY
Payer: COMMERCIAL

## 2025-02-15 VITALS
SYSTOLIC BLOOD PRESSURE: 128 MMHG | RESPIRATION RATE: 16 BRPM | TEMPERATURE: 98 F | DIASTOLIC BLOOD PRESSURE: 61 MMHG | HEART RATE: 78 BPM

## 2025-02-15 DIAGNOSIS — O26.899 OTHER SPECIFIED PREGNANCY RELATED CONDITIONS, UNSPECIFIED TRIMESTER: ICD-10-CM

## 2025-02-15 LAB
AMNISURE ROM (RUPTURE OF MEMBRANES): POSITIVE
ANISOCYTOSIS BLD QL: SLIGHT — SIGNIFICANT CHANGE UP
BASOPHILS # BLD AUTO: 0 K/UL — SIGNIFICANT CHANGE UP (ref 0–0.2)
BASOPHILS NFR BLD AUTO: 0 % — SIGNIFICANT CHANGE UP (ref 0–2)
BLD GP AB SCN SERPL QL: NEGATIVE — SIGNIFICANT CHANGE UP
DACRYOCYTES BLD QL SMEAR: SLIGHT — SIGNIFICANT CHANGE UP
EOSINOPHIL # BLD AUTO: 0.12 K/UL — SIGNIFICANT CHANGE UP (ref 0–0.5)
EOSINOPHIL NFR BLD AUTO: 0.9 % — SIGNIFICANT CHANGE UP (ref 0–6)
GIANT PLATELETS BLD QL SMEAR: PRESENT — SIGNIFICANT CHANGE UP
HCT VFR BLD CALC: 35.8 % — SIGNIFICANT CHANGE UP (ref 34.5–45)
HGB BLD-MCNC: 11.1 G/DL — LOW (ref 11.5–15.5)
HYPOCHROMIA BLD QL: SLIGHT — SIGNIFICANT CHANGE UP
IANC: 8.22 K/UL — HIGH (ref 1.8–7.4)
LYMPHOCYTES # BLD AUTO: 17.5 % — SIGNIFICANT CHANGE UP (ref 13–44)
LYMPHOCYTES # BLD AUTO: 2.25 K/UL — SIGNIFICANT CHANGE UP (ref 1–3.3)
MACROCYTES BLD QL: SLIGHT — SIGNIFICANT CHANGE UP
MCHC RBC-ENTMCNC: 22.1 PG — LOW (ref 27–34)
MCHC RBC-ENTMCNC: 31 G/DL — LOW (ref 32–36)
MCV RBC AUTO: 71.3 FL — LOW (ref 80–100)
MICROCYTES BLD QL: SLIGHT — SIGNIFICANT CHANGE UP
MONOCYTES # BLD AUTO: 1.02 K/UL — HIGH (ref 0–0.9)
MONOCYTES NFR BLD AUTO: 7.9 % — SIGNIFICANT CHANGE UP (ref 2–14)
NEUTROPHILS # BLD AUTO: 8.79 K/UL — HIGH (ref 1.8–7.4)
NEUTROPHILS NFR BLD AUTO: 68.4 % — SIGNIFICANT CHANGE UP (ref 43–77)
OVALOCYTES BLD QL SMEAR: SLIGHT — SIGNIFICANT CHANGE UP
PLAT MORPH BLD: NORMAL — SIGNIFICANT CHANGE UP
PLATELET # BLD AUTO: 252 K/UL — SIGNIFICANT CHANGE UP (ref 150–400)
PLATELET COUNT - ESTIMATE: NORMAL — SIGNIFICANT CHANGE UP
POIKILOCYTOSIS BLD QL AUTO: SLIGHT — SIGNIFICANT CHANGE UP
POLYCHROMASIA BLD QL SMEAR: SLIGHT — SIGNIFICANT CHANGE UP
RBC # BLD: 5.02 M/UL — SIGNIFICANT CHANGE UP (ref 3.8–5.2)
RBC # FLD: 14.6 % — HIGH (ref 10.3–14.5)
RBC BLD AUTO: ABNORMAL
RH IG SCN BLD-IMP: POSITIVE — SIGNIFICANT CHANGE UP
RH IG SCN BLD-IMP: POSITIVE — SIGNIFICANT CHANGE UP
SCHISTOCYTES BLD QL AUTO: SLIGHT — SIGNIFICANT CHANGE UP
TARGETS BLD QL SMEAR: SLIGHT — SIGNIFICANT CHANGE UP
VARIANT LYMPHS # BLD: 5.3 % — SIGNIFICANT CHANGE UP (ref 0–6)
VARIANT LYMPHS NFR BLD MANUAL: 5.3 % — SIGNIFICANT CHANGE UP (ref 0–6)
WBC # BLD: 12.85 K/UL — HIGH (ref 3.8–10.5)
WBC # FLD AUTO: 12.85 K/UL — HIGH (ref 3.8–10.5)

## 2025-02-15 PROCEDURE — 59025 FETAL NON-STRESS TEST: CPT | Mod: 26

## 2025-02-15 RX ORDER — SODIUM CHLORIDE 9 G/1000ML
1000 INJECTION, SOLUTION INTRAVENOUS
Refills: 0 | Status: DISCONTINUED | OUTPATIENT
Start: 2025-02-15 | End: 2025-02-17

## 2025-02-15 RX ORDER — INFLUENZA A VIRUS A/IDAHO/07/2018 (H1N1) ANTIGEN (MDCK CELL DERIVED, PROPIOLACTONE INACTIVATED, INFLUENZA A VIRUS A/INDIANA/08/2018 (H3N2) ANTIGEN (MDCK CELL DERIVED, PROPIOLACTONE INACTIVATED), INFLUENZA B VIRUS B/SINGAPORE/INFTT-16-0610/2016 ANTIGEN (MDCK CELL DERIVED, PROPIOLACTONE INACTIVATED), INFLUENZA B VIRUS B/IOWA/06/2017 ANTIGEN (MDCK CELL DERIVED, PROPIOLACTONE INACTIVATED) 15; 15; 15; 15 UG/.5ML; UG/.5ML; UG/.5ML; UG/.5ML
0.5 INJECTION, SUSPENSION INTRAMUSCULAR ONCE
Refills: 0 | Status: DISCONTINUED | OUTPATIENT
Start: 2025-02-15 | End: 2025-02-20

## 2025-02-15 RX ORDER — CITRIC ACID/SODIUM CITRATE 300-500 MG
15 SOLUTION, ORAL ORAL EVERY 6 HOURS
Refills: 0 | Status: DISCONTINUED | OUTPATIENT
Start: 2025-02-15 | End: 2025-02-17

## 2025-02-15 RX ORDER — OXYTOCIN-SODIUM CHLORIDE 0.9% IV SOLN 30 UNIT/500ML 30-0.9/5 UT/ML-%
167 SOLUTION INTRAVENOUS
Qty: 30 | Refills: 0 | Status: COMPLETED | OUTPATIENT
Start: 2025-02-15 | End: 2025-02-15

## 2025-02-15 RX ADMIN — SODIUM CHLORIDE 125 MILLILITER(S): 9 INJECTION, SOLUTION INTRAVENOUS at 21:39

## 2025-02-15 RX ADMIN — Medication 1 APPLICATION(S): at 18:33

## 2025-02-15 RX ADMIN — SODIUM CHLORIDE 125 MILLILITER(S): 9 INJECTION, SOLUTION INTRAVENOUS at 18:33

## 2025-02-15 NOTE — OB PROVIDER H&P - NSPRENATALGBS_OBGYN_ALL_OB_GET_DAYS
70 y/o F arrives to E.D. rm 27 c/o N/V since last Tuesday, pt had ovarian biopsy last Monday. Pt was dx'd with cancer last month, provider still "looking for source." Pt a&ox4, ambulatory, neg SOB, denies CP, + N/V, neg hematemesis, last BM yesterday, denies fevers/cough/body aches. L 20g IV placed to wrist. Call bell in reach.
23

## 2025-02-15 NOTE — OB PROVIDER H&P - ASSESSMENT
is a 39y female   @40wk, admit for PROM ()    - Discussed with Dr Ojeda  - Admit to Labor and Delivery  - Continuous EFM  - Clear diet, IV fluids  - Consent signed  - Standard labs (T&S, CBC, RPR, COVID PCR and serology)  - Epidural PRN  - Induction/augmentation agent: Cytotec    Risks, benefits, alternatives, and possible complications have been discussed in detail with the patient in her native language. Pre-admission, admission, and post admission procedures and expectations were discussed in detail. All questions answered, all appropriate hospital consents were signed. Anticipate normal vaginal delivery.    Informed consent was obtained. The following was discussed:    - Induction/augmentation of labor: use of medication and/or cook balloon to begin or enhance labor    - Obstetrical management including internal fetal/contraction monitoring    - Normal vaginal delivery    - Possible  section

## 2025-02-15 NOTE — OB RN PATIENT PROFILE - PRO FEEDING PLAN INFANT OB
No
I, Darrell Sanz, performed the initial face to face bedside interview with this patient regarding history of present illness, review of symptoms and relevant past medical, social and family history.  I completed an independent physical examination.  I was the initial provider who evaluated this patient. I have signed out the follow up of any pending tests (i.e. labs, radiological studies) to the ACP.  I have communicated the patient’s plan of care and disposition with the ACP.
initiation of breastfeeding/breast milk feeding

## 2025-02-15 NOTE — OB PROVIDER H&P - OB VTE ASSESSMENT
Pt ambulatory to room. Pt c/o high BP while at home. Pt states she has been checking her BP at home and it was over 629 systolic. Pt states she tried taking cayenne pepper and mendoza per a friends suggestion which did not lower her BP so she came here. Pt denies any symptoms, denies PC, denies SOB, denies headache. Pt A&O x3, skin warm and dry, respirations equal and unlabored bilat, no neuro deficits.      Roger Murillo RN  01/15/18 [id number]
<<--- Click to launch

## 2025-02-15 NOTE — OB RN TRIAGE NOTE - FALL HARM RISK - UNIVERSAL INTERVENTIONS
Bed in lowest position, wheels locked, appropriate side rails in place/Call bell, personal items and telephone in reach/Instruct patient to call for assistance before getting out of bed or chair/Non-slip footwear when patient is out of bed/[place] to call system/Physically safe environment - no spills, clutter or unnecessary equipment/Purposeful Proactive Rounding/Room/bathroom lighting operational, light cord in reach

## 2025-02-15 NOTE — OB RN TRIAGE NOTE - PAIN RATING/NUMBER SCALE (0-10): ACTIVITY
Cresencio Cerda (on HIPAA) would like a call back about patient's recent scan. Patient's memory is worsening.     Please advise 0 (no pain/absence of nonverbal indicators of pain)

## 2025-02-15 NOTE — OB PROVIDER TRIAGE NOTE - HISTORY OF PRESENT ILLNESS
38 y/o P0 @ 40 wks presents for r/o rupture of membranes. Reports gush of fluid yesterday while walking. Leaking has been intermittent. Endorses good fetal movement. Denies vaginal bleeding. No contractions felt. C/o vaginal mucous discharge but has now changed to clear.     GBS negative (1/23)

## 2025-02-15 NOTE — OB PROVIDER TRIAGE NOTE - NS_VISITREASON1_OBGYN_ALL_OB
Initial / Assessment/Plan of Care Note     Baseline Assessment  49 year old admitted 2/17/2018 as Inpatient with a diagnosis of intractable back pain, right side weakness/neglect, expressive aphasia, left M1 occlusion, acute left MCA infarct, and non-small cell lung carcinoma with adrenal and brain metastasis. Underwent angiogram and mechanical thrombectomy completion on 2/20/18.  Medical history includes two recent strokes and left eye visual field loss.  Neuro Interventional, Pain Management, Oncology, and Radiation Oncology consultations involved.  PT/OT/Speech Therapy reordered today.  Extubated from the vent yesterday.  Currently using oxygen at 2LPM per Nursing Patient Story.    Has a peripheral IV line placed.  Prior to admission, patient was living with his wife and children (son, two daughters) in a two story house.  Patient does not have a Power of  for Healthcare document on file.  Patient’s Primary Care Provider is Bette Giron MD.     Medical History  Past Medical History:   Diagnosis Date   • History of ischemic multifocal multiple vascular territories stroke 12/08/2017 12-8-17 mainly R PCA R parietal MCA branch other scattered L cerebellum L MCA felt hypercoagulable also PFO   • Kidney stone    • Non-small cell cancer of left lung (CMS/HCC) 11/01/2017   • PFO (patent foramen ovale) with bidirectional atrial shunt 12/19/2017       Prior to Admission Status       Agency/Support  Type of Services Prior to Hospitalization: None  Support Systems: Spouse/Significant other, Children  Home Devices/Equipment: None  Mobility Assist Devices: None  Sensory Support Devices: None    Current Status  PT Ambulation Tips: Use gait belt, Needs supervision  PT Transfer Tips: Use gait belt, Needs supervision  OT Bathing Tips: Bathes with supervision  OT Dressing Tips: Dresses with supervision  OT Toileting Tips: Toilets with supervision  OT Feeding Tips:    SLP Swallow/Feeding Tips:    SLP Comm/Cog  Tips:    Current Mental Status:    Stressors:      Insurance  Primary: MEDICAID [id number]  Secondary: N/A    Barriers to Discharge  Identified Barriers to Discharge/Transition Planning: Assessment/stabilization in progress    Progress Note  Case opened to assess for discharge needs as part of the stroke protocol.  Pt was transferred yesterday from  to the Neuro ICU.  Pt is currently off the unit for MRI completion.  Chart reviewed.  A&O.  From home.  Lives with his wife and children in a two story house with six entry steps and a one sided railing.  Pt's bedroom is on the main floor level.  Independent ability level at baseline.  No assistive device use.  Spouse or daughter handle household task completion.  Spouse works during the day, but son or daughters are usually home with him.  Pt was approved for Social Security disability as of 12/1/17 per hospital Public 's ([employer]) documentation.  Has Medicaid [employer] for insurance coverage per face sheet.  Aware that pt was undergoing chemotherapy.  Is reflected that palliative left hilar radiation treatment was also being considered.  To follow.            Plan  SW/CM - Recommendations for Discharge: Other (comment) (To Be Determined)  PT - Recommendations for Discharge: Home, OP therapy  OT - Recommendations for Discharge: Home  SLP - Recommendations for Discharge:    Anticipate patient will need post-hospital services. Necessary services are available.  Anticipate patient might be able to return to the environment from which patient entered the hospital pending medical stabilization and therapy input.   Anticipate patient might be able to provide self-care at discharge pending medical stabilization and therapy input.    Refer to SW/CM Flowsheet for Goals and objective data.      Rupture of Membranes/Reduced Fetal Movement

## 2025-02-15 NOTE — OB PROVIDER H&P - HISTORY OF PRESENT ILLNESS
38 y/o P0 @ 40 wks presents for r/o rupture of membranes. Reports gush of fluid yesterday while walking. Leaking has been intermittent. Endorses good fetal movement. Denies vaginal bleeding. No contractions felt. C/o vaginal mucous discharge but has now changed to clear. Amnisure positive    GBS negative (1/23)

## 2025-02-16 LAB
ALBUMIN SERPL ELPH-MCNC: 3.4 G/DL — SIGNIFICANT CHANGE UP (ref 3.3–5)
ALP SERPL-CCNC: 165 U/L — HIGH (ref 40–120)
ALT FLD-CCNC: 22 U/L — SIGNIFICANT CHANGE UP (ref 4–33)
ANION GAP SERPL CALC-SCNC: 13 MMOL/L — SIGNIFICANT CHANGE UP (ref 7–14)
ANISOCYTOSIS BLD QL: SLIGHT — SIGNIFICANT CHANGE UP
AST SERPL-CCNC: 27 U/L — SIGNIFICANT CHANGE UP (ref 4–32)
BASOPHILS # BLD AUTO: 0.15 K/UL — SIGNIFICANT CHANGE UP (ref 0–0.2)
BASOPHILS NFR BLD AUTO: 0.9 % — SIGNIFICANT CHANGE UP (ref 0–2)
BILIRUB SERPL-MCNC: 0.4 MG/DL — SIGNIFICANT CHANGE UP (ref 0.2–1.2)
BUN SERPL-MCNC: 4 MG/DL — LOW (ref 7–23)
CALCIUM SERPL-MCNC: 9.2 MG/DL — SIGNIFICANT CHANGE UP (ref 8.4–10.5)
CHLORIDE SERPL-SCNC: 107 MMOL/L — SIGNIFICANT CHANGE UP (ref 98–107)
CO2 SERPL-SCNC: 18 MMOL/L — LOW (ref 22–31)
CREAT SERPL-MCNC: 0.54 MG/DL — SIGNIFICANT CHANGE UP (ref 0.5–1.3)
DACRYOCYTES BLD QL SMEAR: SLIGHT — SIGNIFICANT CHANGE UP
EGFR: 122 ML/MIN/1.73M2 — SIGNIFICANT CHANGE UP
EOSINOPHIL # BLD AUTO: 0 K/UL — SIGNIFICANT CHANGE UP (ref 0–0.5)
EOSINOPHIL NFR BLD AUTO: 0 % — SIGNIFICANT CHANGE UP (ref 0–6)
GLUCOSE SERPL-MCNC: 68 MG/DL — LOW (ref 70–99)
HCT VFR BLD CALC: 39.6 % — SIGNIFICANT CHANGE UP (ref 34.5–45)
HGB BLD-MCNC: 12.1 G/DL — SIGNIFICANT CHANGE UP (ref 11.5–15.5)
IANC: 12.13 K/UL — HIGH (ref 1.8–7.4)
LDH SERPL L TO P-CCNC: 175 U/L — SIGNIFICANT CHANGE UP (ref 135–225)
LYMPHOCYTES # BLD AUTO: 1.93 K/UL — SIGNIFICANT CHANGE UP (ref 1–3.3)
LYMPHOCYTES # BLD AUTO: 11.3 % — LOW (ref 13–44)
MACROCYTES BLD QL: SLIGHT — SIGNIFICANT CHANGE UP
MCHC RBC-ENTMCNC: 21.6 PG — LOW (ref 27–34)
MCHC RBC-ENTMCNC: 30.6 G/DL — LOW (ref 32–36)
MCV RBC AUTO: 70.6 FL — LOW (ref 80–100)
MICROCYTES BLD QL: SLIGHT — SIGNIFICANT CHANGE UP
MONOCYTES # BLD AUTO: 1.49 K/UL — HIGH (ref 0–0.9)
MONOCYTES NFR BLD AUTO: 8.7 % — SIGNIFICANT CHANGE UP (ref 2–14)
NEUTROPHILS # BLD AUTO: 12.92 K/UL — HIGH (ref 1.8–7.4)
NEUTROPHILS NFR BLD AUTO: 75.6 % — SIGNIFICANT CHANGE UP (ref 43–77)
OVALOCYTES BLD QL SMEAR: SLIGHT — SIGNIFICANT CHANGE UP
PLAT MORPH BLD: NORMAL — SIGNIFICANT CHANGE UP
PLATELET # BLD AUTO: 246 K/UL — SIGNIFICANT CHANGE UP (ref 150–400)
PLATELET COUNT - ESTIMATE: NORMAL — SIGNIFICANT CHANGE UP
POIKILOCYTOSIS BLD QL AUTO: SLIGHT — SIGNIFICANT CHANGE UP
POLYCHROMASIA BLD QL SMEAR: SLIGHT — SIGNIFICANT CHANGE UP
POTASSIUM SERPL-MCNC: 3.6 MMOL/L — SIGNIFICANT CHANGE UP (ref 3.5–5.3)
POTASSIUM SERPL-SCNC: 3.6 MMOL/L — SIGNIFICANT CHANGE UP (ref 3.5–5.3)
PROT SERPL-MCNC: 7 G/DL — SIGNIFICANT CHANGE UP (ref 6–8.3)
RBC # BLD: 5.61 M/UL — HIGH (ref 3.8–5.2)
RBC # FLD: 15 % — HIGH (ref 10.3–14.5)
RBC BLD AUTO: ABNORMAL
SCHISTOCYTES BLD QL AUTO: SLIGHT — SIGNIFICANT CHANGE UP
SODIUM SERPL-SCNC: 138 MMOL/L — SIGNIFICANT CHANGE UP (ref 135–145)
T PALLIDUM AB TITR SER: NEGATIVE — SIGNIFICANT CHANGE UP
TARGETS BLD QL SMEAR: SLIGHT — SIGNIFICANT CHANGE UP
URATE SERPL-MCNC: 4.1 MG/DL — SIGNIFICANT CHANGE UP (ref 2.5–7)
VARIANT LYMPHS # BLD: 3.5 % — SIGNIFICANT CHANGE UP (ref 0–6)
VARIANT LYMPHS NFR BLD MANUAL: 3.5 % — SIGNIFICANT CHANGE UP (ref 0–6)
WBC # BLD: 17.09 K/UL — HIGH (ref 3.8–10.5)
WBC # FLD AUTO: 17.09 K/UL — HIGH (ref 3.8–10.5)

## 2025-02-16 PROCEDURE — 59025 FETAL NON-STRESS TEST: CPT | Mod: 26

## 2025-02-16 DEVICE — SURGICEL SNOW 2 X 4": Type: IMPLANTABLE DEVICE | Status: FUNCTIONAL

## 2025-02-16 RX ORDER — OXYTOCIN-SODIUM CHLORIDE 0.9% IV SOLN 30 UNIT/500ML 30-0.9/5 UT/ML-%
2 SOLUTION INTRAVENOUS
Qty: 30 | Refills: 0 | Status: DISCONTINUED | OUTPATIENT
Start: 2025-02-16 | End: 2025-02-17

## 2025-02-16 RX ADMIN — TERBUTALINE SULFATE 0.25 MILLIGRAM(S): 2.5 TABLET ORAL at 23:42

## 2025-02-16 RX ADMIN — Medication 20 MILLIGRAM(S): at 23:47

## 2025-02-16 RX ADMIN — OXYTOCIN-SODIUM CHLORIDE 0.9% IV SOLN 30 UNIT/500ML 2 MILLIUNIT(S)/MIN: 30-0.9/5 SOLUTION at 19:24

## 2025-02-16 RX ADMIN — OXYTOCIN-SODIUM CHLORIDE 0.9% IV SOLN 30 UNIT/500ML 167 MILLIUNIT(S)/MIN: 30-0.9/5 SOLUTION at 17:57

## 2025-02-16 RX ADMIN — SODIUM CHLORIDE 125 MILLILITER(S): 9 INJECTION, SOLUTION INTRAVENOUS at 17:35

## 2025-02-16 NOTE — OB PROVIDER LABOR PROGRESS NOTE - ASSESSMENT
Cervical exam unchanged  Cervical balloon placed successfully     D/w Dr. Rusty Lundberg Canals PGY1
IOL 2/2 PROM, patient examined s/p cervical balloon    -cervical change appreciated  -explained our recommendation for pitocin given that PROM puts patient at risk of infection. additionally discussed that pitocin would expedite delivery and prolonged labor was associated with increased of bleeding. patient expressed wanting to make plan w  before starting anything  -should patient decline pitocin will repeat VE in two hours and if not making change will recousnel on pitocin    discussed w dr minal barnes pgy4
 39y female  @ 40w1d, admit for PROM ()    -Cat I tracing, ctx regularly.  -Continue current induction plan.  -Continue maternal/fetal monitoring.  -Obtain epidural at this time    D/W Dr. Magan Lopez MD PGY1
39y  @ 40wks/1d here for IOL for PROM    - s/p cervical balloon and buccal cytotec  - VE /-2  - PROM on   - Pitocin @ 4u  - Cont fetal/maternal monitoring  - Will reassess PRN    Plan per Dr. Magan Giles, PGY-1
IOL 2/2 PROM. Patient is currently s/p CB and BC. We had recommended pitocin but patient wanted to hold off until  was present for pitocin. patient examiend with  present, minimal change appreciated on exam    -fore bag ruptured  -patient amenable to pitocin  -c/w fhr/toco  -ant   -patient declining epidural    discussed w dr minal barnes pgy4

## 2025-02-16 NOTE — OB PROVIDER LABOR PROGRESS NOTE - NS_OBIHIFHRDETAILS_OBGYN_ALL_OB_FT
140/mod/+accels/+intermittent variable decels
125/mod/+accels/-decels
125/mod/+accels/-decels
150 bpm, mod variability, +accels, variable decels

## 2025-02-16 NOTE — OB PROVIDER LABOR PROGRESS NOTE - NS_SUBJECTIVE/OBJECTIVE_OBGYN_ALL_OB_FT
Pt seen and examined at bedside
Patient seen and examined for progression of labor. Effective epidural in situ.    T(C): 36.6 (02-16-25 @ 22:40), Max: 37 (02-16-25 @ 12:35)  HR: 134 (02-16-25 @ 23:43) (49 - 134)  BP: 164/80 (02-16-25 @ 23:10) (100/52 - 164/80)  RR: 18 (02-16-25 @ 22:40) (15 - 18)  SpO2: 98% (02-16-25 @ 23:43) (98% - 100%)
Patient reporting intense pressure, urge to push.
patient examined for cervical change s/p cervical balloon
patient examined for cervical change with  in room

## 2025-02-16 NOTE — CHART NOTE - NSCHARTNOTEFT_GEN_A_CORE
pt seen and examined for prolonged decel   VE: 9/100/0   pitocin held   fetal heart rate eventually recovered to  baseline with repositioning   IUPC placed   amnio bolus in progress   continue resuscitative measures

## 2025-02-17 ENCOUNTER — TRANSCRIPTION ENCOUNTER (OUTPATIENT)
Age: 40
End: 2025-02-17

## 2025-02-17 PROCEDURE — 59510 CESAREAN DELIVERY: CPT

## 2025-02-17 RX ORDER — SIMETHICONE 80 MG
80 TABLET,CHEWABLE ORAL EVERY 4 HOURS
Refills: 0 | Status: DISCONTINUED | OUTPATIENT
Start: 2025-02-17 | End: 2025-02-20

## 2025-02-17 RX ORDER — NALBUPHINE HYDROCHLORIDE 10 MG/ML
2.5 INJECTION INTRAMUSCULAR; INTRAVENOUS; SUBCUTANEOUS EVERY 6 HOURS
Refills: 0 | Status: DISCONTINUED | OUTPATIENT
Start: 2025-02-17 | End: 2025-02-20

## 2025-02-17 RX ORDER — MODIFIED LANOLIN 100 %
1 CREAM (GRAM) TOPICAL EVERY 6 HOURS
Refills: 0 | Status: DISCONTINUED | OUTPATIENT
Start: 2025-02-17 | End: 2025-02-20

## 2025-02-17 RX ORDER — DEXAMETHASONE 0.5 MG/1
4 TABLET ORAL EVERY 6 HOURS
Refills: 0 | Status: DISCONTINUED | OUTPATIENT
Start: 2025-02-17 | End: 2025-02-20

## 2025-02-17 RX ORDER — NALOXONE HYDROCHLORIDE 0.4 MG/ML
0.1 INJECTION, SOLUTION INTRAMUSCULAR; INTRAVENOUS; SUBCUTANEOUS
Refills: 0 | Status: DISCONTINUED | OUTPATIENT
Start: 2025-02-17 | End: 2025-02-20

## 2025-02-17 RX ORDER — OXYCODONE HYDROCHLORIDE 30 MG/1
10 TABLET ORAL
Refills: 0 | Status: DISCONTINUED | OUTPATIENT
Start: 2025-02-17 | End: 2025-02-17

## 2025-02-17 RX ORDER — OXYCODONE HYDROCHLORIDE 30 MG/1
5 TABLET ORAL
Refills: 0 | Status: DISCONTINUED | OUTPATIENT
Start: 2025-02-17 | End: 2025-02-17

## 2025-02-17 RX ORDER — DIPHENHYDRAMINE HCL 12.5MG/5ML
25 ELIXIR ORAL EVERY 6 HOURS
Refills: 0 | Status: DISCONTINUED | OUTPATIENT
Start: 2025-02-17 | End: 2025-02-20

## 2025-02-17 RX ORDER — ONDANSETRON HCL/PF 4 MG/2 ML
4 VIAL (ML) INJECTION EVERY 6 HOURS
Refills: 0 | Status: DISCONTINUED | OUTPATIENT
Start: 2025-02-17 | End: 2025-02-20

## 2025-02-17 RX ORDER — HEPARIN SODIUM 1000 [USP'U]/ML
5000 INJECTION INTRAVENOUS; SUBCUTANEOUS EVERY 12 HOURS
Refills: 0 | Status: DISCONTINUED | OUTPATIENT
Start: 2025-02-17 | End: 2025-02-20

## 2025-02-17 RX ORDER — MAGNESIUM HYDROXIDE 400 MG/5ML
30 SUSPENSION ORAL
Refills: 0 | Status: DISCONTINUED | OUTPATIENT
Start: 2025-02-17 | End: 2025-02-20

## 2025-02-17 RX ORDER — TERBUTALINE SULFATE 2.5 MG/1
0.25 TABLET ORAL ONCE
Refills: 0 | Status: COMPLETED | OUTPATIENT
Start: 2025-02-17 | End: 2025-02-16

## 2025-02-17 RX ORDER — PRENATAL 136/IRON/FOLIC ACID 27 MG-1 MG
1 TABLET ORAL DAILY
Refills: 0 | Status: DISCONTINUED | OUTPATIENT
Start: 2025-02-17 | End: 2025-02-20

## 2025-02-17 RX ORDER — OXYCODONE HYDROCHLORIDE 30 MG/1
5 TABLET ORAL
Refills: 0 | Status: COMPLETED | OUTPATIENT
Start: 2025-02-17 | End: 2025-02-24

## 2025-02-17 RX ORDER — BUTORPHANOL TARTRATE 1 MG/ML
0.5 INJECTION, SOLUTION INTRAMUSCULAR; INTRAVENOUS EVERY 6 HOURS
Refills: 0 | Status: DISCONTINUED | OUTPATIENT
Start: 2025-02-17 | End: 2025-02-17

## 2025-02-17 RX ORDER — FERROUS SULFATE 137(45) MG
325 TABLET, EXTENDED RELEASE ORAL DAILY
Refills: 0 | Status: DISCONTINUED | OUTPATIENT
Start: 2025-02-17 | End: 2025-02-20

## 2025-02-17 RX ORDER — SENNA 187 MG
2 TABLET ORAL AT BEDTIME
Refills: 0 | Status: DISCONTINUED | OUTPATIENT
Start: 2025-02-17 | End: 2025-02-20

## 2025-02-17 RX ORDER — OXYCODONE HYDROCHLORIDE 30 MG/1
5 TABLET ORAL ONCE
Refills: 0 | Status: DISCONTINUED | OUTPATIENT
Start: 2025-02-17 | End: 2025-02-20

## 2025-02-17 RX ORDER — OXYTOCIN-SODIUM CHLORIDE 0.9% IV SOLN 30 UNIT/500ML 30-0.9/5 UT/ML-%
42 SOLUTION INTRAVENOUS
Qty: 30 | Refills: 0 | Status: DISCONTINUED | OUTPATIENT
Start: 2025-02-17 | End: 2025-02-17

## 2025-02-17 RX ORDER — IBUPROFEN 200 MG
600 TABLET ORAL EVERY 6 HOURS
Refills: 0 | Status: COMPLETED | OUTPATIENT
Start: 2025-02-17 | End: 2026-01-16

## 2025-02-17 RX ORDER — SODIUM CHLORIDE 9 G/1000ML
1000 INJECTION, SOLUTION INTRAVENOUS
Refills: 0 | Status: DISCONTINUED | OUTPATIENT
Start: 2025-02-17 | End: 2025-02-20

## 2025-02-17 RX ORDER — ACETAMINOPHEN 500 MG/5ML
975 LIQUID (ML) ORAL
Refills: 0 | Status: DISCONTINUED | OUTPATIENT
Start: 2025-02-17 | End: 2025-02-20

## 2025-02-17 RX ORDER — CLOSTRIDIUM TETANI TOXOID ANTIGEN (FORMALDEHYDE INACTIVATED), CORYNEBACTERIUM DIPHTHERIAE TOXOID ANTIGEN (FORMALDEHYDE INACTIVATED), BORDETELLA PERTUSSIS TOXOID ANTIGEN (GLUTARALDEHYDE INACTIVATED), BORDETELLA PERTUSSIS FILAMENTOUS HEMAGGLUTININ ANTIGEN (FORMALDEHYDE INACTIVATED), BORDETELLA PERTUSSIS PERTACTIN ANTIGEN, AND BORDETELLA PERTUSSIS FIMBRIAE 2/3 ANTIGEN 5; 2; 2.5; 5; 3; 5 [LF]/.5ML; [LF]/.5ML; UG/.5ML; UG/.5ML; UG/.5ML; UG/.5ML
0.5 INJECTION, SUSPENSION INTRAMUSCULAR ONCE
Refills: 0 | Status: DISCONTINUED | OUTPATIENT
Start: 2025-02-17 | End: 2025-02-20

## 2025-02-17 RX ORDER — KETOROLAC TROMETHAMINE 30 MG/ML
30 INJECTION, SOLUTION INTRAMUSCULAR; INTRAVENOUS EVERY 6 HOURS
Refills: 0 | Status: DISCONTINUED | OUTPATIENT
Start: 2025-02-17 | End: 2025-02-18

## 2025-02-17 RX ADMIN — KETOROLAC TROMETHAMINE 30 MILLIGRAM(S): 30 INJECTION, SOLUTION INTRAMUSCULAR; INTRAVENOUS at 14:40

## 2025-02-17 RX ADMIN — Medication 975 MILLIGRAM(S): at 18:53

## 2025-02-17 RX ADMIN — Medication 1 TABLET(S): at 14:40

## 2025-02-17 RX ADMIN — KETOROLAC TROMETHAMINE 30 MILLIGRAM(S): 30 INJECTION, SOLUTION INTRAMUSCULAR; INTRAVENOUS at 15:10

## 2025-02-17 RX ADMIN — Medication 975 MILLIGRAM(S): at 19:23

## 2025-02-17 RX ADMIN — HEPARIN SODIUM 5000 UNIT(S): 1000 INJECTION INTRAVENOUS; SUBCUTANEOUS at 13:02

## 2025-02-17 NOTE — DISCHARGE NOTE OB - CARE PROVIDER_API CALL
Sample-Jeannie Blank  Obstetrics and Gynecology  [address], [address]  [place] [address]  Phone: [phone]  Fax: [phone]  Follow Up Time: 2 weeks

## 2025-02-17 NOTE — OB NEONATOLOGY/PEDIATRICIAN DELIVERY SUMMARY - NSPEDSNEONOTESA_OBGYN_ALL_OB_FT
Peds called to OR for 40.1 wk female born via CS to a 38 y/o  blood type O+ mother. Maternal history of _. Prenatal history of _ or No significant maternal or prenatal history. PNL -/-/NR/I, GBS +/- on __. **ROM at __ with clear/meconium fluids. Baby emerged vigorous, crying, was w/d/s/s with APGARS of **/** . Mom plans to initiate breastfeeding/formula feed, consents/declines Hep B vaccine and consents/declines circ. EOS ___. Highest maternal temp ___. Maternal COVID ___ Peds called to OR for 40.1 wk AGA female born via CS to a 38 y/o  blood type O+ mother.  No significant maternal or prenatal history. Mom on PNV and Fe. PNL significant for Hep B NR, RPR NR, rubella immune, HIV NR, Hep C NR, GBS - on . SROM at 1400 on  with clear fluids. Baby emerged vigorous, crying, was w/d/s/s with APGARS of 8/9. At 8MOL baby with mild increased work of breathing and mildly low O2 saturations and CPAP was started. CPAP was continued for 10 minutes until 18MOL with settings of 5/ at which time baby was successfully transitioned to RA. Mom plans to initiate breastfeeding, declines Hep B vaccine. Highest maternal temp 36.9. EOS 0.27    : 25  TOD: 00:15  BW: 2980

## 2025-02-17 NOTE — OB PROVIDER DELIVERY SUMMARY - NSDELIVERYTYPEA_OBGYN_ALL_OB
[Dear  ___] : Dear  [unfilled], [Courtesy Letter:] : I had the pleasure of seeing your patient, [unfilled], in my office today. [Consult Closing:] : Thank you very much for allowing me to participate in the care of this patient.  If you have any questions, please do not hesitate to contact me. [Please see my note below.] : Please see my note below. [Sincerely,] : Sincerely,  Delivery

## 2025-02-17 NOTE — OB RN DELIVERY SUMMARY - NSSELHIDDEN_OBGYN_ALL_OB_FT
[NS_DeliveryAttending1_OBGYN_ALL_OB_FT:[id number]=],[NS_DeliveryAssist1_OBGYN_ALL_OB_FT:[id number]=],[NS_DeliveryRN_OBGYN_ALL_OB_FT:[id number]=]

## 2025-02-17 NOTE — DISCHARGE NOTE OB - MATERIALS PROVIDED
Vaccinations/  Immunization Record/Breastfeeding Log/Bottle Feeding Log/Breastfeeding Mother’s Support Group Information/Guide to Postpartum Care/Breastfeeding Guide and Packet/Discharge Medication Information for Patients and Families Pocket Guide

## 2025-02-17 NOTE — OB RN DELIVERY SUMMARY - NS_SEPSISRSKCALC_OBGYN_ALL_OB_FT
EOS calculated successfully. EOS Risk Factor: 0.5/1000 live births ([facility] national incidence); GA=40w2d; Temp=99.32; ROM=58.25; GBS='Negative'; Antibiotics='No antibiotics or any antibiotics < 2 hrs prior to birth'

## 2025-02-17 NOTE — OB PROVIDER DELIVERY SUMMARY - NSSELHIDDEN_OBGYN_ALL_OB_FT
[NS_DeliveryAttending1_OBGYN_ALL_OB_FT:[id number]],[NS_DeliveryAssist1_OBGYN_ALL_OB_FT:[id number]=],[NS_DeliveryRN_OBGYN_ALL_OB_FT:[id number]=]

## 2025-02-17 NOTE — PROGRESS NOTE ADULT - SUBJECTIVE AND OBJECTIVE BOX
Postop Day  __1_ s/p   C- Section    THERAPY:  [x  ] Spinal morphine   [  ] Epidural morphine   [  ] IV PCA Hydromorphone 1 mg/ml    acetaminophen     Tablet .. 975 milliGRAM(s) Oral <User Schedule>  butorphanol Injectable 0.5 milliGRAM(s) IV Push every 6 hours PRN  chlorhexidine 2% Cloths 1 Application(s) Topical daily  citric acid/sodium citrate Solution 15 milliLiter(s) Oral every 6 hours  dexAMETHasone  Injectable 4 milliGRAM(s) IV Push every 6 hours PRN  diphenhydrAMINE 25 milliGRAM(s) Oral every 6 hours PRN  diphtheria/tetanus/pertussis (acellular) Vaccine (Adacel) 0.5 milliLiter(s) IntraMuscular once  heparin   Injectable 5000 Unit(s) SubCutaneous every 12 hours  ibuprofen  Tablet. 600 milliGRAM(s) Oral every 6 hours  influenza   Vaccine 0.5 milliLiter(s) IntraMuscular once  ketorolac   Injectable 30 milliGRAM(s) IV Push every 6 hours  lactated ringers. 1000 milliLiter(s) IV Continuous <Continuous>  lactated ringers. 1000 milliLiter(s) IV Continuous <Continuous>  lanolin Ointment 1 Application(s) Topical every 6 hours PRN  magnesium hydroxide Suspension 30 milliLiter(s) Oral two times a day PRN  morphine PF Spinal 0.1 milliGRAM(s) IntraThecal. once  nalbuphine Injectable 2.5 milliGRAM(s) IV Push every 6 hours PRN  naloxone Injectable 0.1 milliGRAM(s) IV Push every 3 minutes PRN  ondansetron Injectable 4 milliGRAM(s) IV Push every 6 hours PRN  oxyCODONE    IR 5 milliGRAM(s) Oral every 3 hours PRN  oxyCODONE    IR 5 milliGRAM(s) Oral once PRN  oxyCODONE    IR 5 milliGRAM(s) Oral every 3 hours PRN  oxyCODONE    IR 10 milliGRAM(s) Oral every 3 hours PRN  oxytocin Infusion 42 milliUNIT(s)/Min IV Continuous <Continuous>  oxytocin Infusion. 2 milliUNIT(s)/Min IV Continuous <Continuous>  simethicone 80 milliGRAM(s) Chew every 4 hours PRN      T(C): 37 (02-17-25 @ 07:00), Max: 37 (02-16-25 @ 12:35)  HR: 69 (02-17-25 @ 07:00) (50 - 134)  BP: 141/64 (02-17-25 @ 07:00) (102/56 - 164/80)  RR: 19 (02-17-25 @ 07:00) (16 - 27)  SpO2: 99% (02-17-25 @ 07:00) (96% - 100%)    Pain:   ______mild_____ at rest;  _____moderate______with activity    Sedation Score:	  [ x ] Alert	    [  ] Drowsy        [  ] Arousable	[  ] Asleep	[  ] Unresponsive    Side Effects:	  [  ] None	     [  ] Nausea        [  ] Pruritus        [  ] Weakness   [  ] Numbness        ASSESSMENT/ PLAN  [   ] Side effects resolving      [   ] Patient made aware of PRN meds available     [ x] Discontinue & switch to PRN pain medications        [  ] Continue       Patient states lower extremities feel and move normally. No apparent anesthetic complications.

## 2025-02-17 NOTE — OB NEONATOLOGY/PEDIATRICIAN DELIVERY SUMMARY - BABY A: APGAR 5 MIN SCORE, DELIVERY
9 as above  s/w daughter  discussed intended procedure for Mon-to have cysto/possible bladder biops  or TURBT  rationale risk alternative reviewed-she agrees anad will discuss with remainder of family    aawait non contrast CT-unable to characterize renal stone and size    discussed with daughter and reviewed possible assn of stones and uti-options for stone teatment discussed    at present will not address stone this admission

## 2025-02-17 NOTE — CHART NOTE - NSCHARTNOTEFT_GEN_A_CORE
ANESTHESIA POSTOP CHECK    39y Female POSTOP DAY 1     Vital Signs Last 24 Hrs  T(C): 37 (17 Feb 2025 07:00), Max: 37 (16 Feb 2025 12:35)  T(F): 98.6 (17 Feb 2025 07:00), Max: 98.6 (16 Feb 2025 12:35)  HR: 69 (17 Feb 2025 07:00) (50 - 134)  BP: 141/64 (17 Feb 2025 07:00) (102/56 - 164/80)  BP(mean): 107 (17 Feb 2025 03:45) (69 - 107)  RR: 19 (17 Feb 2025 07:00) (16 - 27)  SpO2: 99% (17 Feb 2025 07:00) (96% - 100%)    Parameters below as of 17 Feb 2025 07:00  Patient On (Oxygen Delivery Method): room air      I&O's Summary    16 Feb 2025 07:01  -  17 Feb 2025 07:00  --------------------------------------------------------  IN: 4100 mL / OUT: 2067 mL / NET: 2033 mL        [X ] NO APPARENT ANESTHESIA COMPLICATIONS      Comments:

## 2025-02-17 NOTE — CHART NOTE - NSCHARTNOTEFT_GEN_A_CORE
*Delayed charting due to remaining at bedside with patient for the following clinical situation*    Patient with increased pain/pressure and requesting epidural. Anesthesia at bedside. FHT noted to have deep late decelerations to werner of 70s. Attendings, Dr. Carrero and Dr. Enriquez at bedside. Patient repositioned with return to baseline after each contraction, though no resolution of late decels. Pitocin paused with no spacing of contractions. Terbutaline 0.25mg SQ administered. No resolution of late decels. Pt consented for urgent  section and moved into OR.    In OR, FHT with recovery and patient able to obtain spinal anesthesia. Please see delivery summary for additional details.    w/ attending, Dr. Magan Chavez PGY3 *Delayed charting due to remaining at bedside with patient for the following clinical situation*    Patient with increased pain/pressure and requesting epidural. Anesthesia at bedside. FHT noted to have deep late decelerations to werner of 70s. SVE 10cm. Attendings, Dr. Carrero and Dr. Enriquez at bedside. Patient repositioned with return to baseline after each contraction, though no resolution of late decels. Pitocin paused with no spacing of contractions. Terbutaline 0.25mg SQ administered. No resolution of late decels. Pt consented for urgent  section and moved into OR. Counseled that even if attempts are made to start pushing, fetus may not tolerate pushing as fetus is already not tolerating adequate contraction pattern. Pt in agreement with decision for c/s.    In OR, FHT with recovery and patient able to obtain spinal anesthesia. Please see delivery summary for additional details.    w/ attending, Dr. Magan Chavez PGY3

## 2025-02-17 NOTE — DISCHARGE NOTE OB - PATIENT PORTAL LINK FT
You can access the FollowMyHealth Patient Portal offered by [facility] by registering at the following website: [email or web address]. By joining [facility]’s FollowMyHealth portal, you will also be able to view your health information using other applications (apps) compatible with our system.

## 2025-02-17 NOTE — CHART NOTE - NSCHARTNOTEFT_GEN_A_CORE
Event: Meet criteria for GHTN    Upon chart review pt meets criteria for gHTN based on elevated Bp > 4rhs apart.   HELLP wnl lab sent on 2/16  p/c ratio not done. Spoke to Dr. Loya no need to repeat HELLP Labs   /66 @ 1958 ( 2/16)  164/80 @ 2310 ( 2/16)  140/69 @ 0315 (2/17)  141/64 @ 0700 ( 2/17)    Evaluated at the bedside to discuss diagnosis of gHTN. Pt denies s/s of PEC  ( Headache, Blurry vision, Epigastric/ RUQ pain ).  Informed patient that given meeting criteria, she is at increased risk of developing hypertensive disorders and/or PEC in future pregnancies. In addition, she was informed that she is at increased risk of developing hypertension and cardiovascular disease outside of pregnancy. In addition, discussed the following with the patient: how to take BP at home, what BP values are concerning (and what are the appropriate next steps given particular values), and the need for follow-up 48-72hrs after discharge. Informed that a blood pressure cuff will be sent to [facility] for pick-up prior to discharge and that the cuff may not be covered by insurance. Reiterated that despite a potential out-of-pocket expense, the blood pressure cuff is still necessary for adequate and appropriate post-partum management. All questions were answered to the patient's apparent satisfaction      Eliana Alatorre NP Event: Meet criteria for GHTN    Upon chart review pt meets criteria for gHTN based on elevated Bp > 4rhs apart.   HELLP wnl lab sent on 2/16  p/c ratio not done. Spoke to Dr. Loya no need to repeat HELLP Labs   /66 @ 1958 ( 2/16)  164/80 @ 2310 ( 2/16)  140/69 @ 0315 (2/17)  141/64 @ 0700 ( 2/17)    Evaluated at the bedside to discuss diagnosis of gHTN. Pt denies s/s of PEC  ( Headache, Blurry vision, Epigastric/ RUQ pain ).  Informed patient that given meeting criteria, she is at increased risk of developing hypertensive disorders and/or PEC in future pregnancies. In addition, she was informed that she is at increased risk of developing hypertension and cardiovascular disease outside of pregnancy. In addition, discussed the following with the patient: how to take BP at home, what BP values are concerning (and what are the appropriate next steps given particular values), and the need for follow-up 48-72hrs after discharge. pt offer blood pressure cuff and states she has working blood pressure monitor at home and declines Bp prescription. Pt informed if she changes her mind a prescription will be sent to [facility] pharmacy for pick-up prior to discharge and that the cuff may not be covered by insurance. Reiterated that despite a potential out-of-pocket expense, the blood pressure cuff is still necessary for adequate and appropriate post-partum management. All questions were answered to the patient's apparent satisfaction.    Plan  - continue monitor Bp  - monitor s/s of PEC    d/w Dr Vincenzo Alatorre, NP

## 2025-02-17 NOTE — OB RN INTRAOPERATIVE NOTE - NSSELHIDDEN_OBGYN_ALL_OB_FT
[NS_DeliveryAttending1_OBGYN_ALL_OB_FT:[id number]],[NS_DeliveryAssist1_OBGYN_ALL_OB_FT:[id number]],[NS_DeliveryRN_OBGYN_ALL_OB_FT:[id number]=]

## 2025-02-17 NOTE — DISCHARGE NOTE OB - FINANCIAL ASSISTANCE
[facility] provides services at a reduced cost to those who are determined to be eligible through [facility]’s financial assistance program. Information regarding [facility]’s financial assistance program can be found by going to [email or web address] or by calling [phone].

## 2025-02-17 NOTE — DISCHARGE NOTE OB - AVOID TUB BATHING UNTIL YOUR POSTPARTUM VISIT
SW received a call from  [phone]) with [facility] who advised she will meet with pt around 1100 and will call this SW after their meeting.    Statement Selected

## 2025-02-17 NOTE — LACTATION INITIAL EVALUATION - LACTATION INTERVENTIONS
Primary Rn made aware of consult and plan./initiate/review safe skin-to-skin/initiate/review hand expression/initiate/review techniques for position and latch/post discharge community resources provided/review techniques to increase milk supply/review techniques to manage sore nipples/engorgement/initiate/review breast massage/compression/reviewed components of an effective feeding and at least 8 effective feedings per day required/reviewed importance of monitoring infant diapers, the breastfeeding log, and minimum output each day/reviewed risks of artificial nipples/reviewed benefits and recommendations for rooming in/reviewed feeding on demand/by cue at least 8 times a day

## 2025-02-17 NOTE — OB PROVIDER DELIVERY SUMMARY - NSPROVIDERDELIVERYNOTE_OBGYN_ALL_OB_FT
Patient fully dilated and requesting epidural. Anesthesia at bedside. FHT with significant recurrent deep late decelerations to werner of 70s. Despite maternal repositioning and Terb 0.25mg administration, no improvement in tracing. Decision made for urgent primary LTCS, uncomplicated. Viable female infant, vertex presentation, Apgars X/X, cord gasses sent. Uterus with 3x2cm fundal subserosal fibroid, otherwise grossly normal fallopian tubes, uterus, and ovaries. Uterus closed in 1 layer with 0-Vicryl suture. Surgicel powder placed over hysterotomy. Fascia reapproximated with 0-Vicryl suture. Subcutaneous tissue reapproximated in 1 layer running fashion with 2-0 plain gut. Skin closed in subcuticular fashion with 3-0 Vicryl suture.    EBL:   IVF:   UOP:    Dictation #    Meryl Chavez, PGY3  w/ attending, Dr. Carrero Patient fully dilated and requesting epidural. Anesthesia at bedside. FHT with significant recurrent deep late decelerations to werner of 70s. Despite maternal repositioning and Terbutaline 0.25mg administration, no improvement in tracing. Decision made for urgent primary LTCS, uncomplicated. Viable female infant, vertex presentation, Apgars 8/9, cord gasses sent. Uterus with 3x2cm fundal subserosal fibroid, otherwise grossly normal fallopian tubes, uterus, and ovaries. Uterus closed in 1 layer with 0-Vicryl suture. Surgicel powder placed over hysterotomy. Fascia reapproximated with 0-Vicryl suture. Subcutaneous tissue reapproximated in 1 layer running fashion with 2-0 plain gut. Skin closed in subcuticular fashion with 3-0 Vicryl suture. Dermabond over incision.    QBL: 372   IVF: 1600  UOP: 325    Dictation #    Meryl Chavez, PGY3  w/ attending, Dr. Carrero Patient fully dilated and requesting epidural. Anesthesia at bedside. FHT with significant recurrent deep late decelerations to werner of 70s. Despite maternal repositioning and Terbutaline 0.25mg administration, no improvement in tracing. Decision made for urgent primary LTCS, uncomplicated. Viable female infant, vertex presentation, Apgars 8/9, cord gasses sent. Uterus with 3x2cm fundal subserosal fibroid, otherwise grossly normal fallopian tubes, uterus, and ovaries. Uterus closed in 1 layer with 0-Vicryl suture. Surgicel powder placed over hysterotomy. Fascia reapproximated with 0-Vicryl suture. Subcutaneous tissue reapproximated in 1 layer running fashion with 2-0 plain gut. Skin closed in subcuticular fashion with 3-0 Vicryl suture. Dermabond over incision.    QBL: 372   IVF: 1600  UOP: 325    Dictation #[id number]    Meryl Chavez, PGY3  w/ attending, Dr. Carrero

## 2025-02-18 LAB
BASOPHILS # BLD AUTO: 0.04 K/UL — SIGNIFICANT CHANGE UP (ref 0–0.2)
BASOPHILS NFR BLD AUTO: 0.2 % — SIGNIFICANT CHANGE UP (ref 0–2)
EOSINOPHIL # BLD AUTO: 0.19 K/UL — SIGNIFICANT CHANGE UP (ref 0–0.5)
EOSINOPHIL NFR BLD AUTO: 0.9 % — SIGNIFICANT CHANGE UP (ref 0–6)
HCT VFR BLD CALC: 27.9 % — LOW (ref 34.5–45)
HCT VFR BLD CALC: 28 % — LOW (ref 34.5–45)
HGB BLD-MCNC: 8.6 G/DL — LOW (ref 11.5–15.5)
HGB BLD-MCNC: 8.7 G/DL — LOW (ref 11.5–15.5)
IANC: 15.55 K/UL — HIGH (ref 1.8–7.4)
IMM GRANULOCYTES NFR BLD AUTO: 0.9 % — SIGNIFICANT CHANGE UP (ref 0–0.9)
LYMPHOCYTES # BLD AUTO: 15.3 % — SIGNIFICANT CHANGE UP (ref 13–44)
LYMPHOCYTES # BLD AUTO: 3.19 K/UL — SIGNIFICANT CHANGE UP (ref 1–3.3)
MCHC RBC-ENTMCNC: 22.1 PG — LOW (ref 27–34)
MCHC RBC-ENTMCNC: 22.1 PG — LOW (ref 27–34)
MCHC RBC-ENTMCNC: 30.8 G/DL — LOW (ref 32–36)
MCHC RBC-ENTMCNC: 31.1 G/DL — LOW (ref 32–36)
MCV RBC AUTO: 71.2 FL — LOW (ref 80–100)
MCV RBC AUTO: 71.5 FL — LOW (ref 80–100)
MONOCYTES # BLD AUTO: 1.7 K/UL — HIGH (ref 0–0.9)
MONOCYTES NFR BLD AUTO: 8.2 % — SIGNIFICANT CHANGE UP (ref 2–14)
NEUTROPHILS # BLD AUTO: 15.55 K/UL — HIGH (ref 1.8–7.4)
NEUTROPHILS NFR BLD AUTO: 74.5 % — SIGNIFICANT CHANGE UP (ref 43–77)
NRBC # BLD AUTO: 0 K/UL — SIGNIFICANT CHANGE UP (ref 0–0)
NRBC # BLD AUTO: 0 K/UL — SIGNIFICANT CHANGE UP (ref 0–0)
NRBC # FLD: 0 K/UL — SIGNIFICANT CHANGE UP (ref 0–0)
NRBC # FLD: 0 K/UL — SIGNIFICANT CHANGE UP (ref 0–0)
NRBC BLD AUTO-RTO: 0 /100 WBCS — SIGNIFICANT CHANGE UP (ref 0–0)
NRBC BLD AUTO-RTO: 0 /100 WBCS — SIGNIFICANT CHANGE UP (ref 0–0)
PLATELET # BLD AUTO: 183 K/UL — SIGNIFICANT CHANGE UP (ref 150–400)
PLATELET # BLD AUTO: 211 K/UL — SIGNIFICANT CHANGE UP (ref 150–400)
RBC # BLD: 3.9 M/UL — SIGNIFICANT CHANGE UP (ref 3.8–5.2)
RBC # BLD: 3.93 M/UL — SIGNIFICANT CHANGE UP (ref 3.8–5.2)
RBC # FLD: 14.9 % — HIGH (ref 10.3–14.5)
RBC # FLD: 14.9 % — HIGH (ref 10.3–14.5)
WBC # BLD: 19.64 K/UL — HIGH (ref 3.8–10.5)
WBC # BLD: 20.85 K/UL — HIGH (ref 3.8–10.5)
WBC # FLD AUTO: 19.64 K/UL — HIGH (ref 3.8–10.5)
WBC # FLD AUTO: 20.85 K/UL — HIGH (ref 3.8–10.5)

## 2025-02-18 RX ORDER — IBUPROFEN 200 MG
600 TABLET ORAL EVERY 6 HOURS
Refills: 0 | Status: DISCONTINUED | OUTPATIENT
Start: 2025-02-18 | End: 2025-02-20

## 2025-02-18 RX ADMIN — Medication 975 MILLIGRAM(S): at 03:25

## 2025-02-18 RX ADMIN — Medication 975 MILLIGRAM(S): at 20:25

## 2025-02-18 RX ADMIN — Medication 975 MILLIGRAM(S): at 08:51

## 2025-02-18 RX ADMIN — Medication 1 TABLET(S): at 12:20

## 2025-02-18 RX ADMIN — Medication 975 MILLIGRAM(S): at 14:48

## 2025-02-18 RX ADMIN — KETOROLAC TROMETHAMINE 30 MILLIGRAM(S): 30 INJECTION, SOLUTION INTRAMUSCULAR; INTRAVENOUS at 01:37

## 2025-02-18 RX ADMIN — Medication 975 MILLIGRAM(S): at 09:21

## 2025-02-18 RX ADMIN — Medication 325 MILLIGRAM(S): at 12:20

## 2025-02-18 RX ADMIN — Medication 975 MILLIGRAM(S): at 21:00

## 2025-02-18 RX ADMIN — HEPARIN SODIUM 5000 UNIT(S): 1000 INJECTION INTRAVENOUS; SUBCUTANEOUS at 12:26

## 2025-02-18 RX ADMIN — Medication 600 MILLIGRAM(S): at 12:20

## 2025-02-18 RX ADMIN — HEPARIN SODIUM 5000 UNIT(S): 1000 INJECTION INTRAVENOUS; SUBCUTANEOUS at 01:06

## 2025-02-18 RX ADMIN — Medication 600 MILLIGRAM(S): at 18:36

## 2025-02-18 RX ADMIN — KETOROLAC TROMETHAMINE 30 MILLIGRAM(S): 30 INJECTION, SOLUTION INTRAMUSCULAR; INTRAVENOUS at 01:05

## 2025-02-18 RX ADMIN — Medication 600 MILLIGRAM(S): at 18:09

## 2025-02-18 RX ADMIN — Medication 600 MILLIGRAM(S): at 12:50

## 2025-02-18 RX ADMIN — Medication 975 MILLIGRAM(S): at 15:20

## 2025-02-18 NOTE — PROGRESS NOTE ADULT - ATTENDING COMMENTS
OB Attending    Patient seen and examined at bedside POD#1 s/p primary C/S  Agree with above assessment and plan  Vitals and labs stable  Abdominal exam benign  Incision c/d/i with dermabond    Plan  -continue current inpatient care    DEEPIKA Moran-MD Abhay

## 2025-02-18 NOTE — PROGRESS NOTE ADULT - SUBJECTIVE AND OBJECTIVE BOX
*incomplete note to be finalized    Patient seen and examined at bedside. No acute complaints, pain controlled with medication. Patient is ambulating and tolerating regular diet. Has not yet passed flatus. ***Luo is still in place. ***Patient is passing gas and voiding spontaneously. Denies CP, SOB, N/V, HA, dizziness or lightheadedness.    Vital Signs Last 24 Hours  T(C): 36.4 (02-18-25 @ 06:16), Max: 37 (02-17-25 @ 10:19)  HR: 52 (02-18-25 @ 06:16) (51 - 84)  BP: 103/57 (02-18-25 @ 06:16) (100/68 - 113/63)  RR: 19 (02-18-25 @ 06:16) (17 - 19)  SpO2: 100% (02-18-25 @ 06:16) (98% - 100%)    I&O's Summary    17 Feb 2025 07:01  -  18 Feb 2025 07:00  --------------------------------------------------------  IN: 0 mL / OUT: 2450 mL / NET: -2450 mL        Physical Exam:  General: NAD  CV: clinically well perfused  Pulm: breathing comfortably on room air   Abdomen: Soft, appropriately-tender, non-distended, fundus firm  Incision: Pfannenstiel incision CDI, subcuticular suture closure  Pelvic: Lochia wnl on pad  Lower extremities: no calf tenderness bilaterally     Labs:    Blood Type: O Positive  Antibody Screen: Negative  RPR: Negative               8.6    20.85 )-----------( 183      ( 02-18 @ 05:40 )             27.9                12.1   17.09 )-----------( 246      ( 02-16 @ 21:05 )             39.6                11.1   12.85 )-----------( 252      ( 02-15 @ 18:41 )             35.8         MEDICATIONS  (STANDING):  acetaminophen     Tablet .. 975 milliGRAM(s) Oral <User Schedule>  diphtheria/tetanus/pertussis (acellular) Vaccine (Adacel) 0.5 milliLiter(s) IntraMuscular once  ferrous    sulfate 325 milliGRAM(s) Oral daily  heparin   Injectable 5000 Unit(s) SubCutaneous every 12 hours  ibuprofen  Tablet. 600 milliGRAM(s) Oral every 6 hours  influenza   Vaccine 0.5 milliLiter(s) IntraMuscular once  lactated ringers. 1000 milliLiter(s) (125 mL/Hr) IV Continuous <Continuous>  morphine PF Spinal 0.1 milliGRAM(s) IntraThecal. once  prenatal multivitamin 1 Tablet(s) Oral daily  senna 2 Tablet(s) Oral at bedtime    MEDICATIONS  (PRN):  dexAMETHasone  Injectable 4 milliGRAM(s) IV Push every 6 hours PRN Nausea  diphenhydrAMINE 25 milliGRAM(s) Oral every 6 hours PRN Pruritus  lanolin Ointment 1 Application(s) Topical every 6 hours PRN Sore Nipples  magnesium hydroxide Suspension 30 milliLiter(s) Oral two times a day PRN Constipation  nalbuphine Injectable 2.5 milliGRAM(s) IV Push every 6 hours PRN Pruritus  naloxone Injectable 0.1 milliGRAM(s) IV Push every 3 minutes PRN For ANY of the following changes in patient status:  A. Breaths Per Minute LESS THAN 10, B. Oxygen saturation LESS THAN 90%, C. Sedation score of 6 for Stop After: 4 Times  ondansetron Injectable 4 milliGRAM(s) IV Push every 6 hours PRN Nausea  oxyCODONE    IR 5 milliGRAM(s) Oral every 3 hours PRN Moderate to Severe Pain (4-10)  oxyCODONE    IR 5 milliGRAM(s) Oral once PRN Moderate to Severe Pain (4-10)  simethicone 80 milliGRAM(s) Chew every 4 hours PRN Gas   Patient seen and examined at bedside. No acute complaints, pain controlled with medication. Patient is ambulating and tolerating regular diet. Has not yet passed flatus. ***Luo is still in place. ***Patient is passing gas and voiding spontaneously. Denies CP, SOB, N/V, HA, dizziness or lightheadedness.    Vital Signs Last 24 Hours  T(C): 36.4 (02-18-25 @ 06:16), Max: 37 (02-17-25 @ 10:19)  HR: 52 (02-18-25 @ 06:16) (51 - 84)  BP: 103/57 (02-18-25 @ 06:16) (100/68 - 113/63)  RR: 19 (02-18-25 @ 06:16) (17 - 19)  SpO2: 100% (02-18-25 @ 06:16) (98% - 100%)    I&O's Summary    17 Feb 2025 07:01  -  18 Feb 2025 07:00  --------------------------------------------------------  IN: 0 mL / OUT: 2450 mL / NET: -2450 mL        Physical Exam:  General: NAD  CV: clinically well perfused  Pulm: breathing comfortably on room air   Abdomen: Soft, appropriately-tender, non-distended, fundus firm  Incision: Pfannenstiel incision CDI, subcuticular suture closure  Pelvic: Lochia wnl on pad  Lower extremities: no calf tenderness bilaterally     Labs:    Blood Type: O Positive  Antibody Screen: Negative  RPR: Negative               8.6    20.85 )-----------( 183      ( 02-18 @ 05:40 )             27.9                12.1   17.09 )-----------( 246      ( 02-16 @ 21:05 )             39.6                11.1   12.85 )-----------( 252      ( 02-15 @ 18:41 )             35.8         MEDICATIONS  (STANDING):  acetaminophen     Tablet .. 975 milliGRAM(s) Oral <User Schedule>  diphtheria/tetanus/pertussis (acellular) Vaccine (Adacel) 0.5 milliLiter(s) IntraMuscular once  ferrous    sulfate 325 milliGRAM(s) Oral daily  heparin   Injectable 5000 Unit(s) SubCutaneous every 12 hours  ibuprofen  Tablet. 600 milliGRAM(s) Oral every 6 hours  influenza   Vaccine 0.5 milliLiter(s) IntraMuscular once  lactated ringers. 1000 milliLiter(s) (125 mL/Hr) IV Continuous <Continuous>  morphine PF Spinal 0.1 milliGRAM(s) IntraThecal. once  prenatal multivitamin 1 Tablet(s) Oral daily  senna 2 Tablet(s) Oral at bedtime    MEDICATIONS  (PRN):  dexAMETHasone  Injectable 4 milliGRAM(s) IV Push every 6 hours PRN Nausea  diphenhydrAMINE 25 milliGRAM(s) Oral every 6 hours PRN Pruritus  lanolin Ointment 1 Application(s) Topical every 6 hours PRN Sore Nipples  magnesium hydroxide Suspension 30 milliLiter(s) Oral two times a day PRN Constipation  nalbuphine Injectable 2.5 milliGRAM(s) IV Push every 6 hours PRN Pruritus  naloxone Injectable 0.1 milliGRAM(s) IV Push every 3 minutes PRN For ANY of the following changes in patient status:  A. Breaths Per Minute LESS THAN 10, B. Oxygen saturation LESS THAN 90%, C. Sedation score of 6 for Stop After: 4 Times  ondansetron Injectable 4 milliGRAM(s) IV Push every 6 hours PRN Nausea  oxyCODONE    IR 5 milliGRAM(s) Oral every 3 hours PRN Moderate to Severe Pain (4-10)  oxyCODONE    IR 5 milliGRAM(s) Oral once PRN Moderate to Severe Pain (4-10)  simethicone 80 milliGRAM(s) Chew every 4 hours PRN Gas   Patient seen and examined at bedside. No acute complaints, pain controlled with medication. Patient is ambulating and tolerating regular diet. Patient is passing gas and voiding spontaneously. Denies CP, SOB, N/V, HA, dizziness or lightheadedness.    Vital Signs Last 24 Hours  T(C): 36.4 (02-18-25 @ 06:16), Max: 37 (02-17-25 @ 10:19)  HR: 52 (02-18-25 @ 06:16) (51 - 84)  BP: 103/57 (02-18-25 @ 06:16) (100/68 - 113/63)  RR: 19 (02-18-25 @ 06:16) (17 - 19)  SpO2: 100% (02-18-25 @ 06:16) (98% - 100%)    I&O's Summary    17 Feb 2025 07:01  -  18 Feb 2025 07:00  --------------------------------------------------------  IN: 0 mL / OUT: 2450 mL / NET: -2450 mL        Physical Exam:  General: NAD  CV: clinically well perfused  Pulm: breathing comfortably on room air   Abdomen: Soft, appropriately-tender, non-distended, fundus firm  Incision: Pfannenstiel incision CDI, subcuticular suture closure  Pelvic: Lochia wnl on pad  Lower extremities: no calf tenderness bilaterally     Labs:    Blood Type: O Positive  Antibody Screen: Negative  RPR: Negative               8.6    20.85 )-----------( 183      ( 02-18 @ 05:40 )             27.9                12.1   17.09 )-----------( 246      ( 02-16 @ 21:05 )             39.6                11.1   12.85 )-----------( 252      ( 02-15 @ 18:41 )             35.8         MEDICATIONS  (STANDING):  acetaminophen     Tablet .. 975 milliGRAM(s) Oral <User Schedule>  diphtheria/tetanus/pertussis (acellular) Vaccine (Adacel) 0.5 milliLiter(s) IntraMuscular once  ferrous    sulfate 325 milliGRAM(s) Oral daily  heparin   Injectable 5000 Unit(s) SubCutaneous every 12 hours  ibuprofen  Tablet. 600 milliGRAM(s) Oral every 6 hours  influenza   Vaccine 0.5 milliLiter(s) IntraMuscular once  lactated ringers. 1000 milliLiter(s) (125 mL/Hr) IV Continuous <Continuous>  morphine PF Spinal 0.1 milliGRAM(s) IntraThecal. once  prenatal multivitamin 1 Tablet(s) Oral daily  senna 2 Tablet(s) Oral at bedtime    MEDICATIONS  (PRN):  dexAMETHasone  Injectable 4 milliGRAM(s) IV Push every 6 hours PRN Nausea  diphenhydrAMINE 25 milliGRAM(s) Oral every 6 hours PRN Pruritus  lanolin Ointment 1 Application(s) Topical every 6 hours PRN Sore Nipples  magnesium hydroxide Suspension 30 milliLiter(s) Oral two times a day PRN Constipation  nalbuphine Injectable 2.5 milliGRAM(s) IV Push every 6 hours PRN Pruritus  naloxone Injectable 0.1 milliGRAM(s) IV Push every 3 minutes PRN For ANY of the following changes in patient status:  A. Breaths Per Minute LESS THAN 10, B. Oxygen saturation LESS THAN 90%, C. Sedation score of 6 for Stop After: 4 Times  ondansetron Injectable 4 milliGRAM(s) IV Push every 6 hours PRN Nausea  oxyCODONE    IR 5 milliGRAM(s) Oral every 3 hours PRN Moderate to Severe Pain (4-10)  oxyCODONE    IR 5 milliGRAM(s) Oral once PRN Moderate to Severe Pain (4-10)  simethicone 80 milliGRAM(s) Chew every 4 hours PRN Gas

## 2025-02-19 DIAGNOSIS — O42.10 PREMATURE RUPTURE OF MEMBRANES, ONSET OF LABOR MORE THAN 24 HOURS FOLLOWING RUPTURE, UNSPECIFIED WEEKS OF GESTATION: ICD-10-CM

## 2025-02-19 RX ORDER — OXYCODONE HYDROCHLORIDE 30 MG/1
5 TABLET ORAL
Refills: 0 | Status: DISCONTINUED | OUTPATIENT
Start: 2025-02-19 | End: 2025-02-20

## 2025-02-19 RX ORDER — ACETAMINOPHEN 500 MG/5ML
3 LIQUID (ML) ORAL
Qty: 0 | Refills: 0 | DISCHARGE
Start: 2025-02-19

## 2025-02-19 RX ORDER — IBUPROFEN 200 MG
1 TABLET ORAL
Qty: 0 | Refills: 0 | DISCHARGE
Start: 2025-02-19

## 2025-02-19 RX ADMIN — Medication 975 MILLIGRAM(S): at 20:33

## 2025-02-19 RX ADMIN — Medication 975 MILLIGRAM(S): at 09:45

## 2025-02-19 RX ADMIN — HEPARIN SODIUM 5000 UNIT(S): 1000 INJECTION INTRAVENOUS; SUBCUTANEOUS at 00:15

## 2025-02-19 RX ADMIN — Medication 325 MILLIGRAM(S): at 12:23

## 2025-02-19 RX ADMIN — Medication 975 MILLIGRAM(S): at 14:35

## 2025-02-19 RX ADMIN — Medication 600 MILLIGRAM(S): at 18:22

## 2025-02-19 RX ADMIN — Medication 600 MILLIGRAM(S): at 12:23

## 2025-02-19 RX ADMIN — Medication 975 MILLIGRAM(S): at 21:30

## 2025-02-19 RX ADMIN — Medication 600 MILLIGRAM(S): at 05:57

## 2025-02-19 RX ADMIN — Medication 600 MILLIGRAM(S): at 06:30

## 2025-02-19 RX ADMIN — Medication 975 MILLIGRAM(S): at 08:45

## 2025-02-19 RX ADMIN — Medication 1 TABLET(S): at 12:25

## 2025-02-19 RX ADMIN — Medication 80 MILLIGRAM(S): at 18:23

## 2025-02-19 RX ADMIN — Medication 975 MILLIGRAM(S): at 15:35

## 2025-02-19 RX ADMIN — HEPARIN SODIUM 5000 UNIT(S): 1000 INJECTION INTRAVENOUS; SUBCUTANEOUS at 12:24

## 2025-02-19 RX ADMIN — Medication 600 MILLIGRAM(S): at 00:43

## 2025-02-19 RX ADMIN — Medication 80 MILLIGRAM(S): at 12:26

## 2025-02-19 RX ADMIN — Medication 600 MILLIGRAM(S): at 13:23

## 2025-02-19 RX ADMIN — Medication 600 MILLIGRAM(S): at 00:15

## 2025-02-19 NOTE — PROGRESS NOTE ADULT - SUBJECTIVE AND OBJECTIVE BOX
OB Attending Postpartum Note    Subjective:  The patient feels well. She is ambulating.  She is tolerating regular diet. She denies nausea and vomiting. She is voiding. Her pain is controlled with oral medication. She reports normal postpartum bleeding. She is breastfeeding.    Objective:    Vital Signs Last 24 Hrs  T(C): 36.6 (19 Feb 2025 06:00), Max: 37.2 (18 Feb 2025 10:40)  T(F): 97.9 (19 Feb 2025 06:00), Max: 98.9 (18 Feb 2025 10:40)  HR: 53 (19 Feb 2025 06:00) (53 - 68)  BP: 123/61 (19 Feb 2025 06:00) (100/58 - 123/61)  RR: 18 (19 Feb 2025 06:00) (17 - 18)  SpO2: 99% (19 Feb 2025 06:00) (99% - 100%)    Parameters below as of 19 Feb 2025 06:00  Patient On (Oxygen Delivery Method): room air        Physical exam:  Gen: NAD  Abdomen: Softly distended, appropriately tender. Firm uterine fundus at umbilicus.  Incision: Clean, dry, and intact   Pelvic: Deferred  Ext: No calf tenderness or edema bilaterally    LABS:                        8.7    19.64 )-----------( 211      ( 18 Feb 2025 19:30 )             28.0               MEDICATIONS  (STANDING):  acetaminophen     Tablet .. 975 milliGRAM(s) Oral <User Schedule>  diphtheria/tetanus/pertussis (acellular) Vaccine (Adacel) 0.5 milliLiter(s) IntraMuscular once  ferrous    sulfate 325 milliGRAM(s) Oral daily  heparin   Injectable 5000 Unit(s) SubCutaneous every 12 hours  ibuprofen  Tablet. 600 milliGRAM(s) Oral every 6 hours  influenza   Vaccine 0.5 milliLiter(s) IntraMuscular once  lactated ringers. 1000 milliLiter(s) (125 mL/Hr) IV Continuous <Continuous>  morphine PF Spinal 0.1 milliGRAM(s) IntraThecal. once  prenatal multivitamin 1 Tablet(s) Oral daily  senna 2 Tablet(s) Oral at bedtime    MEDICATIONS  (PRN):  dexAMETHasone  Injectable 4 milliGRAM(s) IV Push every 6 hours PRN Nausea  diphenhydrAMINE 25 milliGRAM(s) Oral every 6 hours PRN Pruritus  lanolin Ointment 1 Application(s) Topical every 6 hours PRN Sore Nipples  magnesium hydroxide Suspension 30 milliLiter(s) Oral two times a day PRN Constipation  nalbuphine Injectable 2.5 milliGRAM(s) IV Push every 6 hours PRN Pruritus  naloxone Injectable 0.1 milliGRAM(s) IV Push every 3 minutes PRN For ANY of the following changes in patient status:  A. Breaths Per Minute LESS THAN 10, B. Oxygen saturation LESS THAN 90%, C. Sedation score of 6 for Stop After: 4 Times  ondansetron Injectable 4 milliGRAM(s) IV Push every 6 hours PRN Nausea  oxyCODONE    IR 5 milliGRAM(s) Oral every 3 hours PRN Moderate to Severe Pain (4-10)  oxyCODONE    IR 5 milliGRAM(s) Oral once PRN Moderate to Severe Pain (4-10)  simethicone 80 milliGRAM(s) Chew every 4 hours PRN Gas

## 2025-02-19 NOTE — PROGRESS NOTE ADULT - ASSESSMENT
Last appt 2/1/24    Next appt 5/2/24     Assessment and Plan  39y  yo P1 POD # 1 s/p primary LTCS for cat II tracing. Doing well.  -Continue routine post operative care  -Oral pain meds for pain control.  -HSQ and ambulation for DVT ppx  -discharge planning for POD#3    N Sample-MD Abhay   Assessment and Plan  39y  yo P1 POD # 2 s/p primary LTCS for cat II tracing. Doing well.  -Continue routine post operative care  -Oral pain meds for pain control.  -HSQ and ambulation for DVT ppx  -discharge planning for POD#3    N Sample-MD Abhay

## 2025-02-20 ENCOUNTER — APPOINTMENT (OUTPATIENT)
Dept: OBGYN | Facility: CLINIC | Age: 40
End: 2025-02-20

## 2025-02-20 ENCOUNTER — APPOINTMENT (OUTPATIENT)
Dept: ANTEPARTUM | Facility: CLINIC | Age: 40
End: 2025-02-20

## 2025-02-20 VITALS
DIASTOLIC BLOOD PRESSURE: 77 MMHG | RESPIRATION RATE: 17 BRPM | SYSTOLIC BLOOD PRESSURE: 134 MMHG | HEART RATE: 62 BPM | OXYGEN SATURATION: 99 % | TEMPERATURE: 98 F

## 2025-02-20 RX ADMIN — HEPARIN SODIUM 5000 UNIT(S): 1000 INJECTION INTRAVENOUS; SUBCUTANEOUS at 00:34

## 2025-02-20 RX ADMIN — HEPARIN SODIUM 5000 UNIT(S): 1000 INJECTION INTRAVENOUS; SUBCUTANEOUS at 11:47

## 2025-02-20 RX ADMIN — Medication 975 MILLIGRAM(S): at 04:30

## 2025-02-20 RX ADMIN — Medication 600 MILLIGRAM(S): at 00:34

## 2025-02-20 RX ADMIN — Medication 975 MILLIGRAM(S): at 03:39

## 2025-02-20 RX ADMIN — Medication 600 MILLIGRAM(S): at 11:46

## 2025-02-20 RX ADMIN — Medication 600 MILLIGRAM(S): at 06:59

## 2025-02-20 RX ADMIN — Medication 600 MILLIGRAM(S): at 00:40

## 2025-02-20 RX ADMIN — Medication 80 MILLIGRAM(S): at 06:24

## 2025-02-20 RX ADMIN — Medication 325 MILLIGRAM(S): at 11:48

## 2025-02-20 RX ADMIN — Medication 600 MILLIGRAM(S): at 06:22

## 2025-02-20 RX ADMIN — Medication 975 MILLIGRAM(S): at 09:42

## 2025-02-20 NOTE — PROGRESS NOTE ADULT - SUBJECTIVE AND OBJECTIVE BOX
SUBJECTIVE:    Pain: Controlled    Complaints: None    MILESTONES:    Alert and Oriented x 3  [ x ]  Out of bed/ ambulating. [ x ]  Flatus:   Positive [ x ]  Negative [  ]  Bowel movement  [  ] Positive [  ] Negative   Voiding [x  ] Due to void [  ]   Luo/Indwelling catheter in place [  ]  Diet: Regular [ x ]  Clears [  ]  NPO [  ]    Infant feeding:  Breast [  ]   Bottle [  ]  Both [ X ]  Feeding related issues and/or concerns:      OBJECTIVE:  T(C): 36.7 (25 @ 05:59), Max: 37 (25 @ 13:40)  HR: 60 (25 @ 05:59) (56 - 61)  BP: 139/60 (25 @ 05:59) (113/54 - 139/60)  RR: 17 (25 @ 05:59) (17 - 17)  SpO2: 100% (25 @ 05:59) (100% - 100%)  Wt(kg): --                        8.7    19.64 )-----------( 211      ( 2025 19:30 )             28.0           Blood Type: O Positive    RPR: Negative          MEDICATIONS  (STANDING):  acetaminophen     Tablet .. 975 milliGRAM(s) Oral <User Schedule>  diphtheria/tetanus/pertussis (acellular) Vaccine (Adacel) 0.5 milliLiter(s) IntraMuscular once  ferrous    sulfate 325 milliGRAM(s) Oral daily  heparin   Injectable 5000 Unit(s) SubCutaneous every 12 hours  ibuprofen  Tablet. 600 milliGRAM(s) Oral every 6 hours  influenza   Vaccine 0.5 milliLiter(s) IntraMuscular once  lactated ringers. 1000 milliLiter(s) (125 mL/Hr) IV Continuous <Continuous>  morphine PF Spinal 0.1 milliGRAM(s) IntraThecal. once  prenatal multivitamin 1 Tablet(s) Oral daily  senna 2 Tablet(s) Oral at bedtime    MEDICATIONS  (PRN):  dexAMETHasone  Injectable 4 milliGRAM(s) IV Push every 6 hours PRN Nausea  diphenhydrAMINE 25 milliGRAM(s) Oral every 6 hours PRN Pruritus  lanolin Ointment 1 Application(s) Topical every 6 hours PRN Sore Nipples  magnesium hydroxide Suspension 30 milliLiter(s) Oral two times a day PRN Constipation  nalbuphine Injectable 2.5 milliGRAM(s) IV Push every 6 hours PRN Pruritus  naloxone Injectable 0.1 milliGRAM(s) IV Push every 3 minutes PRN For ANY of the following changes in patient status:  A. Breaths Per Minute LESS THAN 10, B. Oxygen saturation LESS THAN 90%, C. Sedation score of 6 for Stop After: 4 Times  ondansetron Injectable 4 milliGRAM(s) IV Push every 6 hours PRN Nausea  oxyCODONE    IR 5 milliGRAM(s) Oral once PRN Moderate to Severe Pain (4-10)  oxyCODONE    IR 5 milliGRAM(s) Oral every 3 hours PRN Moderate to Severe Pain (4-10)  simethicone 80 milliGRAM(s) Chew every 4 hours PRN Gas        ASSESSMENT:    39y     G  1    P  1001       PO Day#  3        Delivery: Primary [ X ]    Repeat [  ]       QBL - 372      GHTN,  HELLP - wnl                                  Indication of procedure: Cat 2 Tracing    Condition: Stable    Past Medical History significant for: HPI:  40 y/o P0 @ 40 wks presents for r/o rupture of membranes. Reports gush of fluid yesterday while walking. Leaking has been intermittent. Endorses good fetal movement. Denies vaginal bleeding. No contractions felt. C/o vaginal mucous discharge but has now changed to clear. Amnisure positive    GBS negative () (15 Feb 2025 15:27)      Current Issues:    Breasts:  Soft [x  ]   Engorged [  ]  Nipples:  Abdomen: Soft [ x ]   Distended [  ] Nontender [  ]     Bowel sounds :  Present [  ]  Absent [  ]   Fundus:  Firm [x  ]  Boggy [  ]    Abdominal incision: Clean, Dry and Intact [x  ]  Staples [  ] Steri Strips [  ] Dermabond [ X ] Sutures [  ]   Swelling noted on the mons pubis.  Ice as needed.    Patient wearing abdominal binder for support.    Vaginal: Lochia:  Heavy [  ]  Moderate [ x ]   Scant [  ]    Extremities: Edema [X  ] Negative Neto's Sign [ X ] Nontender David  [ x ] Positive pedal pulses [  ]    Other relevant physical exam findings:      PLAN:    Plan: Increase ambulation, analgesia PRN and pain medication protocol standing oxycodone, ibuprofen and acetaminophen.    Diet: Regular diet    GHTN - The patient was reminded to  the  B/P Monitor waiting at the [facility] pharmacy for her. The criteria for monitoring and reporting her B/P's to her OB were reviewed with the patient, who verbalized understanding of the information given. A B/P Log was given to the patient.    Continue routine post-operative and postpartum care.     Discharge Planning [ x ]    For Discharge Today  [  X  ]    Consults:  Social Work [  ]  Lactation [ x ]  Other [         ]

## 2025-03-03 ENCOUNTER — APPOINTMENT (OUTPATIENT)
Dept: OBGYN | Facility: CLINIC | Age: 40
End: 2025-03-03
Payer: COMMERCIAL

## 2025-03-03 VITALS
DIASTOLIC BLOOD PRESSURE: 74 MMHG | HEIGHT: 61 IN | WEIGHT: 132 LBS | SYSTOLIC BLOOD PRESSURE: 113 MMHG | BODY MASS INDEX: 24.92 KG/M2

## 2025-03-03 PROCEDURE — 0503F POSTPARTUM CARE VISIT: CPT

## 2025-03-31 ENCOUNTER — APPOINTMENT (OUTPATIENT)
Dept: OBGYN | Facility: CLINIC | Age: 40
End: 2025-03-31
Payer: COMMERCIAL

## 2025-03-31 VITALS
HEIGHT: 61 IN | SYSTOLIC BLOOD PRESSURE: 108 MMHG | WEIGHT: 129 LBS | DIASTOLIC BLOOD PRESSURE: 66 MMHG | BODY MASS INDEX: 24.35 KG/M2

## 2025-03-31 PROCEDURE — 0503F POSTPARTUM CARE VISIT: CPT

## 2025-06-16 NOTE — OB PROVIDER TRIAGE NOTE - HOW PATIENT ADDRESSED, PROFILE
[facility]  Advanced Heart Failure, MCS, Transplant and Pulmonary Hypertension Cardiology  HF Progress Note    Date: 2025   Patient Name: El Stroud  : 1952  MRN: [id number]   PCP: Stephan Scott MD    Reason for consult: HF, advanced therapies eval  Physician requesting consult: Be Elliott MD    Chief complaint: Dyspnea. LVAD eval    HPI:  El Stroud is a 73 year old male with PMHx of ICM, HFrEF, CAD s/p CABG x1 in , bicuspid AoV s/p bioprosthetic AVR, severe MR s/p mitraclip (25), cardioMEMS (25), PHTN, HTN, HLD, CKD Stage 3, afib, obesity, and depression who initially presented to CHF clinic at [facility] (25) w/ c/o worsening edema, MENDIETA, dizziness, weakness, hypotension, weight gain, and elevated pressures on CardioMEMS. Pt underwent RHC (25) that showed elevated bilateral filling pressures and low PA sat. TTE done showed LVEF 39%, mild-moderate MR, and moderate TR. Pt was started on IV Bumex for diuresis and given concern for cardiogenic shock, pt was started on Dobutamine with improvement, but unable to wean off with noted decrease in MvO2 and CO/CI. Decision was made to transfer pt to [facility] (25) for further management and potential advanced therapies evaluation.    Subjective: Pt seen and examined resting in bed this AM. No acute complaints, denies CP and SOB. Stable on IABP 1:1.    ROS:  CONSTITUTIONAL:  No weight loss, fever, chills, sweats.  HEENT:  No visual loss, blurred vision, double vision. No hearing loss, sneezing, congestion, runny nose or sore throat.  SKIN:  No rash or itching.  MUSCULOSKELETAL:  No joint redness or swelling. No recent trauma.  CARDIOVASCULAR:  No chest pain, chest pressure or chest discomfort. No palpitations, PND, orthopnea or edema.  RESPIRATORY:  No shortness of breath, cough or sputum.  GASTROINTESTINAL:  No anorexia, nausea, vomiting or diarrhea. No abdominal pain or blood.    GENITOURINARY:  No burning on urination, no decreased urine output.   NEUROLOGICAL:   No headache, dizziness, syncope, paralysis, ataxia, numbness or tingling in the extremities.   PSYCHIATRIC:  No history of depression or anxiety.    Past Medical History:   Diagnosis Date    A-fib  (CMD)     Acute on chronic heart failure with reduced ejection fraction (HFrEF, <= 40%) and combined systolic and diastolic dysfunction (CMD) 04/22/2025    Aortic stenosis due to bicuspid aortic valve 01/01/2006    Arthritis     Back pain     Bioprosthetic aortic valve replacement during current hospitalization 09/07/2012    aortic insufficiency and aortic stenosis of bioprosthetic valve - replaced with 29 mm Medtronic Freestyle valve    Congestive cardiac failure  (CMD)     Coronary artery disease 02/24/2025    PCI/Stent to RCA graft    Depression     Essential (primary) hypertension     High cholesterol     Ischemic cardiomyopathy 05/12/2025    Mitral regurgitation     NSTEMI (non-ST elevated myocardial infarction)  (CMD) 02/2025    Pulmonary hypertension  (CMD) 04/22/2025    (PA 88/38 CVP 18 CO 3.5 CI 1.5 118/53  HR 90 NSR )    S/P CABG (coronary artery bypass graft) 05/12/2025    Sleep apnea     CPAP    Status post implantation of mitral valve leaflet clip 05/12/2025    Status post insertion of pressure sensor into pulmonary artery 04/16/2025    Cardio Mems    Tricuspid valve regurgitation      Past Surgical History:   Procedure Laterality Date    Cardiac surgery      Cath place for coronary angiography w md sup - interp graft & rt heart      Cervical fusion      Laparoscopic rpr hiatal hernia  08/30/2019    Removal gallbladder      Replac aort valv prosth valv      x2, 2008 & 2012 29 freestyle with SVG to RCA    Rotator cuff repair Bilateral     x4    Total knee replacement Left     Total knee replacement Right     Transcatheter implant of wireless pulmonary artery pressure sensor  04/16/2025    Cardio Mems     Family History    Problem Relation Age of Onset    Heart disease Mother      Social History     Tobacco Use    Smoking status: Former     Current packs/day: 0.00     Types: Cigarettes     Quit date:      Years since quittin.4    Smokeless tobacco: Never   Vaping Use    Vaping status: never used   Substance Use Topics    Alcohol use: Not Currently     Alcohol/week: 14.0 standard drinks of alcohol     Types: 14 Cans of beer per week     Comment: 2 beers daily    Drug use: Not Currently     Current Facility-Administered Medications   Medication    lidocaine (LIDOCARE) 4 % patch 1 patch    docusate sodium-sennosides (SENOKOT S) 50-8.6 MG 2 tablet    polyethylene glycol (MIRALAX) packet 17 g    acetaminophen (TYLENOL) tablet 650 mg    Or    acetaminophen (TYLENOL) suppository 650 mg    sodium chloride 0.9 % injection 10 mL    sodium chloride 0.9 % injection 2 mL    heparin (porcine) 25,000 units/250 mL in dextrose 5 % infusion    heparin (porcine) injection 4,000 Units    heparin (porcine) injection 2,000 Units    Potassium Replacement (Levels 3.6 - 4)    Magnesium Standard Replacement Protocol    dextrose 50 % injection 25 g    dextrose 50 % injection 12.5 g    glucagon (GLUCAGEN) injection 1 mg    dextrose (GLUTOSE) 40 % gel 15 g    dextrose (GLUTOSE) 40 % gel 30 g    melatonin tablet 3 mg    sodium chloride 0.9 % injection 10 mL    furosemide (LASIX) 250 mg in NaCl 0.9% 125 mL infusion    dextrose 5 % infusion    sodium chloride 0.9% infusion    sodium chloride 0.9% infusion    sodium chloride 0.9% infusion    heparin 2 unit/mL in NaCL 0.9% solution    sodium chloride 0.9% infusion    midodrine (PROAMATINE) tablet 15 mg    ondansetron (ZOFRAN) injection 4 mg    CARBOXYMethylcellulose (REFRESH TEARS) 0.5 % ophthalmic solution 1 drop    folic acid (FOLATE) tablet 1 mg    cyclobenzaprine (FLEXERIL) tablet 5 mg    sodium chloride 0.9 % injection 10 mL    [Held by provider] cholecalciferol (VITAMIN D) 1.25 mg(50,000 units)  capsule 1.25 mg    DOBUTamine (DOBUTREX) 500 mg/250 mL dextrose 5 % infusion    sodium chloride 0.9 % injection 20 mL    escitalopram (LEXAPRO) tablet 30 mg    rosuvastatin (CRESTOR) tablet 40 mg    pantoprazole (PROTONIX) EC tablet 40 mg    sodium chloride 0.9 % injection 10 mL    sodium chloride 0.9 % injection 2 mL    sodium chloride 0.9% infusion    acetaminophen (TYLENOL) tablet 650 mg    Or    acetaminophen (TYLENOL) suppository 650 mg     ALLERGIES:  No Known Allergies     Physical exam:  Visit Vitals  /50   Pulse 91   Temp 97.9 °F (36.6 °C) (Axillary)   Resp 14   Ht 6' 0.01\" (1.829 m)   Wt 101.3 kg (223 lb 5.2 oz)   SpO2 99%   BMI 30.28 kg/m²     GENERAL:  NAD, AOx3.   HEENT:  Sclerae were anicteric and oropharynx was clear.    The carotid upstroke was normal and there were no bruits.   CHEST: Lungs bilat CTA.  No rales  CV:  NSR +IABP    No m/r/g    The PMI was not enlarged or displaced. No RV heave.    -JVD  -HJR  ABDOMEN: Soft, non-distended with active bowel sounds.     No hepatomegaly. No ascites  EXTREM: Warm, well-perfused and with no cyanosis, clubbing    No peripheral edema.   SKIN:  No ulcerations or rashes.   NEURO: Non-focal.  PSYCH:  Normal affect, normal mood.    Hemodynamics:   Last Value 24 Hour Range   CVP 10 mmHg No data recorded   PAS/PAD (!) 50/16 No data recorded   PCWP   No data recorded   CO 5.6 l/min No data recorded   CI 2.6 l/min/m2 No data recorded    (dyne*sec)/cm5 No data recorded   SV02   No data recorded     Recent labs:   I independently reviewed the following labs, echocardiograms, imaging, and procedures    Sodium (mmol/L)   Date Value   06/16/2025 130 (L)     Potassium (mmol/L)   Date Value   06/16/2025 3.6     Chloride (mmol/L)   Date Value   06/16/2025 95 (L)     Glucose (mg/dL)   Date Value   06/16/2025 115 (H)     Calcium (mg/dL)   Date Value   06/16/2025 9.8     Carbon Dioxide (mmol/L)   Date Value   06/16/2025 28     BUN (mg/dL)   Date Value   06/16/2025  22 (H)     Creatinine (mg/dL)   Date Value   2025 1.19 (H)     WBC (K/mcL)   Date Value   2025 5.7     RBC (mil/mcL)   Date Value   2025 3.18 (L)     HCT (%)   Date Value   2025 31.4 (L)     HGB (g/dL)   Date Value   2025 10.4 (L)     PLT (K/mcL)   Date Value   2025 142     TSH (mcUnits/mL)   Date Value   06/15/2025 2.724     NT-proBNP (pg/mL)   Date Value   2025 21,325 (H)     Encounter Date: 25   Electrocardiogram 12-Lead   Result Value    Ventricular Rate EKG/Min (BPM) 88    Atrial Rate (BPM) 88    ME-Interval (MSEC) 168    QRS-Interval (MSEC) 152    QT-Interval (MSEC) 454    QTc 549    P Axis (Degrees) 80    R Axis (Degrees) -61    T Axis (Degrees) 53    REPORT TEXT      Normal sinus rhythm  Left axis deviation  Right bundle branch block  Abnormal ECG  When compared with ECG of  23-MAY-2025 23:16,  pacs no longer   present  Confirmed by NAOMI CORREA MD ([id number]) on 2025 4:59:25 PM       Imaging/Diagnostics:  Results for orders placed or performed during the hospital encounter of 25   TRANSTHORACIC ECHO (TTE) COMPLETE W/ W/O IMAGING AGENT    Impression    *[facility]*  [address] [address] [phone]  Transthoracic Echocardiogram (TTE)    Patient: El Stroud      Study Date/Time:     May 29 2025 2:13PM  MRN:     [id number]                   FIN#:                [id number]  :     1952                Ht/Wt:               183cm 102kg  Age:     73                        BSA/BMI:             2.3m^2 30.5kg/m^2  Gender:  M                         Baseline BP:         88 / 69  *Ordering Physician:* Dot Kraft     *Referring Physician:* Josesito Arnold     *Attending Physician:* Be Elliott MD     *Diagnostic Physician:* Brittany Castro MD  *Sonographer:* JESSIE Green     ------------------------------------------------------------------------------  INDICATIONS:   Congestive  heart failure.    ------------------------------------------------------------------------------  STUDY CONCLUSIONS  SUMMARY:    1. Left ventricle: The cavity size is dilated. Wall thickness is increased.     Systolic function is moderately reduced. Akinesis of the apical wall. The     ejection fraction is 39%.  2. Right ventricle: The cavity size is mildly to moderately dilated. Systolic     function is mildly reduced.  3. Mitral valve: Prior procedures include transcatheter xcom-um-mqhh repair     (AILYN). Inflow velocity is increased. There is moderate to severe     regurgitation, with multiple jets. The mean diastolic gradient is 8mm Hg at     a HR of 97 bpm.  4. Aortic valve: A bioprosthetic valve is present and functioning normally.  5. Tricuspid valve: There is moderate regurgitation.    ------------------------------------------------------------------------------  STUDY DATA:  Patient room number: 8130e1. Comparison is made to the study of  05/24/2025.  Procedure:  A transthoracic echocardiogram was performed. Image  quality was adequate. Intravenous contrast (Definity 2ml) was administered to  opacify the left ventricle.  M-mode, complete 2D, complete spectral Doppler,  and color Doppler.  Study status:  Routine.  Study completion:  There were no  complications.    FINDINGS    LEFT VENTRICLE:  The cavity size is dilated. Wall thickness is increased.  Systolic function is moderately reduced. There is moderate diffuse  hypokinesis.  Akinesis of the apical wall.    The ejection fraction was  measured by biplane method of disks. The ejection fraction is 39%. Left  ventricular diastolic function parameters are indeterminate at present time.    AORTIC VALVE:  A bioprosthetic valve is present and functioning normally. The  annulus is normal. Velocity is within the normal range. The mean systolic  gradient is 3mm Hg.    AORTA:  Aortic root: The root is normal-sized.  Ascending aorta: The vessel is  normal-sized.    MITRAL VALVE:  Prior procedures include transcatheter fniz-tz-eyse repair  (AILYN). Inflow velocity is increased. There is moderate to severe  regurgitation, with multiple jets. The mean diastolic gradient is 8mm Hg. The  peak diastolic gradient is 13mm Hg.    LEFT ATRIUM:  The atrium is severely dilated.    RIGHT VENTRICLE:  The cavity size is mildly to moderately dilated. Systolic  function is mildly reduced. The TAPSE is normal, suggestive of normal RV  systolic function. Systolic pressure is increased. The estimated peak pressure  is 52 mm Hg. Catheter noted in right ventricle.       The RV pressure during  systole is 52mm Hg.    PULMONIC VALVE:   The leaflets are normal thickness. Velocity is within the  normal range. There is no evidence for stenosis. There is trivial  regurgitation.    TRICUSPID VALVE:  The leaflets are normal thickness. Inflow velocity is within  the normal range. There is no evidence for stenosis. There is moderate  regurgitation.    PULMONARY ARTERIES:  Systolic pressure is increased.    RIGHT ATRIUM:  The atrium is dilated.       The estimated central venous  pressure is 15mm Hg.    PERICARDIUM:   There is no pericardial effusion.    SYSTEMIC VEINS:  Inferior vena cava: The IVC is dilated.  Respirophasic diameter changes are  blunted (< 50%).    ------------------------------------------------------------------------------  Measurements     Left ventricle            Value        Ref        05/24/2025  Left atrium continued     Value          Ref     05/24/2025   DIAZ, LAX chord        (H) 6.3   cm     4.2 - 5.8  6.0         Area/bsa ES, A4C          13.29 cm^2/m^2 ------- ----------   DIAZ/bsa, LAX chord    (N) 2.7   cm/m^2 2.2 - 3.0  2.7         Area ES, A2C              32    cm^2     ------- ----------   PW, ED, LAX           (H) 1.4   cm     0.6 - 1.0  0.9         Area/bsa ES, A2C          13.73 cm^2/m^2 ------- ----------   DIAZ major ax, A4C         11.0  cm      ---------- ----------  SI dim, A2C               7.0   cm       ------- ----------   ESD major ax, A4C         10.7  cm     ---------- ----------  Vol, S                (H) 114   ml       18 - 58 ----------   FS major axis, A4C        3     %      ---------- ----------  Vol/bsa, S            (H) 50    ml/m^2   16 - 34 ----------   DIAZ/bsa major ax, A4C     4.8   cm/m^2 ---------- ----------  Vol, ES, 1-p A4C      (H) 105   ml       18 - 58 ----------   ESD/bsa major ax, A4C     4.6   cm/m^2 ---------- ----------  Vol/bsa, ES, 1-p A4C  (H) 46    ml/m^2   12 - 37 ----------   EFRA, A4C                  55.4  cm^2   ---------- ----------  Vol, ES, 1-p A2C      (H) 119   ml       18 - 58 ----------   ARLETH, A4C                  43.6  cm^2   ---------- ----------  Vol/bsa, ES, 1-p A2C  (H) 52    ml/m^2   11 - 43 ----------   FAC, A4C                  21    %      ---------- ----------  Vol, ES, 2-p              114   ml       ------- ----------   IVS, ED               (H) 1.4   cm     0.6 - 1.0  1.0         Vol/bsa, ES, 2-p      (H) 50    ml/m^2   16 - 34 ----------   PW, ED                (H) 1.4   cm     0.6 - 1.0  0.9   IVS/PW, ED                0.97         ---------- 1.04        Aortic valve              Value          Ref     05/24/2025   EDV                   (H) 244   ml     62 - 150   220         Peak v, S                 1.2   m/sec    ------- ----------   ESV                   (H) 95    ml     21 - 61    118         Mean v, S                 0.93  m/sec    ------- ----------   EF                    (L) 39    %      52 - 72    39          Mean grad, S              3     mm Hg    ------- ----------   SV                        103   ml     ---------- 70   EDV/bsa               (H) 106   ml/m^2 34 - 74    97          Mitral valve              Value          Ref     05/24/2025   ESV/bsa               (H) 41    ml/m^2 11 - 31    52          Mean v, D                 1.33  m/sec    ------- 1.38   SV/bsa                     45    ml/m^2 ---------- 31          Peak E                    1.77  m/sec    ------- 2   SV, 1-p A4C               87    ml     ---------- ----------  Peak A                    1.52  m/sec    ------- 1.82   SV/bsa, 1-p A4C           38    ml/m^2 ---------- ----------  VTI leaflet coapt         45.0  cm       ------- 34.8   EDV, 2-p              (H) 279   ml     62 - 150   ----------  MiV/LVOT VTI              2.8            ------- ----------   ESV, 2-p              (H) 169   ml     21 - 61    ----------  Decel time                274   ms       ------- 236   EF, 2-p               (L) 39    %      52 - 72    ----------  Mean grad, D              8     mm Hg    ------- 9   SV, 2-p                   110   ml     ---------- ----------  Peak grad, D              13    mm Hg    ------- 16   EDV/bsa, 2-p          (H) 121   ml/m^2 34 - 74    ----------  Peak E/A ratio            1.2            ------- 1.1   ESV/bsa, 2-p          (H) 73    ml/m^2 11 - 31    ----------  A-VTI                     44.6  cm       ------- 34.8   SV/bsa, 2-p               47.8  ml/m^2 ---------- ----------                                                                 Pulmonic valve            Value          Ref     05/24/2025   LVOT                      Value        Ref        05/24/2025  IL v, ED                  1.53  m/sec    ------- ----------   Peak hilary, S               0.78  m/sec  ---------- ----------  IL grad, ED               9     mm Hg    ------- ----------   Peak grad, S              2     mm Hg  ---------- ----------                                                                 Tricuspid valve           Value          Ref     05/24/2025   Right ventricle           Value        Ref        05/24/2025  TR peak v             (H) 3     m/sec    <=2.8   3.1   DIAZ, LAX                  3.1   cm     ---------- ----------  Peak RV-RA grad, S        37    mm Hg    ------- 39   TAPSE, MM             (N) 1.9   cm     >=1.7       ----------   Pressure, S               52    mm Hg  ---------- 54          Pulmonary artery          Value          Ref     05/24/2025   S' lateral            (N) 9.2   cm/sec 6.0 - 13.4 ----------  Pressure, S               52    mm Hg    ------- ----------                                                                 Pressure ED               24    mm Hg    ------- ----------   Left atrium               Value        Ref        05/24/2025   AP dim, ES            (H) 4.8   cm     3.0 - 4.0  5.5         Systemic veins            Value          Ref     05/24/2025   AP dim index, ES      (N) 2.1   cm/m^2 1.5 - 2.3  2.4         Estimated CVP             15    mm Hg    ------- 15   Area ES, A4C          (H) 31    cm^2   <=20       ----------  Legend:  (L)  and  (H)  susan values outside specified reference range.    (N)  marks values inside specified reference range.    Prepared and electronically signed by  Brittany Castro MD  05/29/2025 19:21     Stress Test with Myocardial Perfusion  Results for orders placed or performed during the hospital encounter of 05/28/25   Cath/PV Case    Narrative    PROCEDURE PERFORMED:    Left femoral access and sheath placement.   Successful placement of an intra-aortic balloon pump  via left femoral   artery access.      CONCLUSION:  Cardiogenic shock s/p IABP placement via left femoral access.  Balloon   pump is set at 1:1.      RECOMMENDATION:  Heparin drip while balloon pump is in place.    Chest X-Ray for placement  Keep site with sterile dressing, dry.    Rest of care per ICU/Cardiology/Heart failure team.           Impression and plan:    #Acute on Chronic HFrEF Exacerbation  #Severe MR s/p Mitraclip on (4/20/25)  #Bicuspid AoV s/p Bioprosthetic SAVR in 2008 and Aortic Root Reconstruction + Redo in 2012  #Combine Post/Pre-Capillary PHTN  #Heart Failure related Cardiogenic Shock  - Etiology: ICM  - ACC/AHA stage D, NYHA class IV  - RHC (5/23/25, baseline): RA 7, PAP 46/22/31,  ROBERT 3.4, PA sat 50%, CO/CI 4.6/2.09 (F).  - Limited TTE (5/24/25): LVEDD 6 cm, LVEF 39%, normal bioprosthetic AoV function, mitraclip noted w/ mild-moderate MR, LA moderately dilated, moderate TR, IVC dilated.  - TTE (5/29/25 while off dobutamine): LVEF 35%, independent reviewed. LVEDD 6.3. RVEF mild to mod reduced. RV dilated. MitraClip gradient 8-10 mmHg with mod at least MR.  - Worsening cardiogenic shock overnight 6/8/25 requiring repeat swan and addition of Milrinone, now off.  - Pt unable to be weaned off Dobutamine.   - S/p IABP insertion 6/9/25  Current GDMT: see changes below   - Diuretic: Lasix 40 mg/hr   - BB: None d/t symptomatic hypotension  - RAASi: None d/t symptomatic hypotension  - MRA: None d/t symptomatic hypotension  - SGLT2i: None d/t symptomatic hypotension  - Hydral/Nitrate: None  - Inotrope: Dobutamine 5 mcg/kg/min. Milrinone weaned off (6/12/25)  - AA: None  - AC: Heparin gtt hx of AFib  - Other: Crestor 40 mg QD, Midodrine 15 mg PO TID  Device therapy: Pt does not have ICD. Pt has CardioMEMS (4/16/25)  Advanced therapies: Consented to VAD w/u DT (05/30/25). Presented in MDM 6/4/25.    #CAD s/p CABG   #HLD  - Underwent CABG x1 (SVG to RCA) in 2012  - S/p PCI of SVG to RCA in 2013 and 3/2025  - Continue Statin  - Heparin gtt    #Paroxysmal Afib  #RBBB with QRS 160s  - Patient does not have ICD  - Currently NSR rates 90s-110s  - Takes Eliquis at home, holding in favor of Heparin gtt as inpatient  - Monitor tele    #CKD Stage 3a  - Cr up to 1.77 at [facility]  - CREATININE: 1.19 today  - Continue diuresis as above  - Nephrology recs appreciated    #Reported carpel tunnel hx   - Pt confirms carpel tunnel in the Rt hand   - Finding concerning for amyloid process. NM Amyloid 6/3/25: No scintigraphic evidence of transthyretin cardiac amyloidosis (Grade 0).    #HTN  - GDMT limited 2/2 hypotension and requiring Midodrine     #Hx HoTN   - Became hypotensive upon transferring to chair w/ BP 80s/40s.   -  On Midodrine    #Obesity  - Body mass index is 30.28 kg/m².       Plan:  - Stage D, NYHA IV HFrEF  - S/p IABP insertion 6/9/25 -> s/p IABP exchange w/ L fem access (06/13/25), continue at 1:1.   - Continue Dobutamine 5 mcg/kg/min. Monitor hemos.   - Continue Lasix 40 mg/hr. Monitor UOP and renal function.  - Continue Midodrine 15 mg PO TID   - CTA C/A/P (6/1/35): Aortic root measures 4 cm, no aortic dissection or high grade aneurysm seen - reviewed with CV surg  - NM amyloid scan negative (G0)  - TTE (5/29/2025, while off dobutamine): LVEF 35%, independent reviewed. LVEDD 6.3. RVEF mild to mod reduced. RV dilated. MitraClip gradient 8-10 mmHg with mod at least MR  - ARLEY (06/13/25): LVEF 35%, no significant mitral stenosis. There is severe MR and moderate perivalvular AI.   - LVEF remains around 35%. Valvular disease may be main cause of refractory heart failure.   - CTA TAVR done (6/14/25), awaiting final read/results. Ongoing discussions w/ CVSx re: AVR/MVR.   - Not a transplant candidate d/t age    Charting performed by angel Cevallos for Dr. Davila.        The documentation recorded by the angel accurately and completely reflects the service(s) I personally performed and the decisions made by me.      Vlad Davila M.D.      jessica

## 2025-06-21 NOTE — LACTATION INITIAL EVALUATION - HYPERTHYROID
no Quinolones Counseling:  I discussed with the patient the risks of fluoroquinolones including but not limited to GI upset, allergic reaction, drug rash, diarrhea, dizziness, photosensitivity, yeast infections, liver function test abnormalities, tendonitis/tendon rupture. Libtayo Pregnancy And Lactation Text: This medication is contraindicated in pregnancy and when breast feeding. Fluconazole Pregnancy And Lactation Text: This medication is Pregnancy Category C and it isn't know if it is safe during pregnancy. It is also excreted in breast milk. Taltz Counseling: I discussed with the patient the risks of ixekizumab including but not limited to immunosuppression, serious infections, worsening of inflammatory bowel disease and drug reactions.  The patient understands that monitoring is required including a PPD at baseline and must alert us or the primary physician if symptoms of infection or other concerning signs are noted. Cimetidine Counseling:  I discussed with the patient the risks of Cimetidine including but not limited to gynecomastia, headache, diarrhea, nausea, drowsiness, arrhythmias, pancreatitis, skin rashes, psychosis, bone marrow suppression and kidney toxicity. Ilumya Pregnancy And Lactation Text: The risk during pregnancy and breastfeeding is uncertain with this medication. Ivermectin Pregnancy And Lactation Text: This medication is Pregnancy Category C and it isn't known if it is safe during pregnancy. It is also excreted in breast milk. Cyclophosphamide Pregnancy And Lactation Text: This medication is Pregnancy Category D and it isn't considered safe during pregnancy. This medication is excreted in breast milk. Dupixent Counseling: I discussed with the patient the risks of dupilumab including but not limited to eye inflammation and irritation, cold sores, injection site reactions, allergic reactions and increased risk of parasitic infection. The patient understands that monitoring is required and they must alert us or the primary physician if symptoms of infection or other concerning signs are noted. Litfulo Counseling: I discussed with the patient the risks of Litfulo therapy including but not limited to upper respiratory tract infections, shingles, cold sores, and nausea. Live vaccines should be avoided.  This medication has been linked to serious infections; higher rate of mortality; malignancy and lymphoproliferative disorders; major adverse cardiovascular events; thrombosis; gastrointestinal perforations; neutropenia; lymphopenia; anemia; liver enzyme elevations; and lipid elevations. Zyclara Pregnancy And Lactation Text: This medication is Pregnancy Category C. It is unknown if this medication is excreted in breast milk. Xeljessicaz Pregnancy And Lactation Text: This medication is Pregnancy Category D and is not considered safe during pregnancy.  The risk during breast feeding is also uncertain. Topical Clindamycin Counseling: Patient counseled that this medication may cause skin irritation or allergic reactions.  In the event of skin irritation, the patient was advised to reduce the amount of the drug applied or use it less frequently.   The patient verbalized understanding of the proper use and possible adverse effects of clindamycin.  All of the patient's questions and concerns were addressed. Azelaic Acid Pregnancy And Lactation Text: This medication is considered safe during pregnancy and breast feeding. Niacinamide Counseling: I recommended taking niacin or niacinamide, also know as vitamin B3, twice daily. Recent evidence suggests that taking vitamin B3 (500 mg twice daily) can reduce the risk of actinic keratoses and non-melanoma skin cancers. Side effects of vitamin B3 include flushing and headache. Oral Minoxidil Pregnancy And Lactation Text: This medication should only be used when clearly needed if you are pregnant, attempting to become pregnant or breast feeding. Finasteride Pregnancy And Lactation Text: This medication is absolutely contraindicated during pregnancy. It is unknown if it is excreted in breast milk. Libtayo Counseling- I discussed with the patient the risks of Libtayo including but not limited to nausea, vomiting, diarrhea, and bone or muscle pain.  The patient verbalized understanding of the proper use and possible adverse effects of Libtayo.  All of the patient's questions and concerns were addressed. Griseofulvin Counseling:  I discussed with the patient the risks of griseofulvin including but not limited to photosensitivity, cytopenia, liver damage, nausea/vomiting and severe allergy.  The patient understands that this medication is best absorbed when taken with a fatty meal (e.g., ice cream or french fries). Stelara Pregnancy And Lactation Text: This medication is Pregnancy Category B and is considered safe during pregnancy. It is unknown if this medication is excreted in breast milk. Cimetidine Pregnancy And Lactation Text: This medication is Pregnancy Category B and is considered safe during pregnancy. It is also excreted in breast milk and breast feeding isn't recommended. Ilumya Counseling: I discussed with the patient the risks of tildrakizumab including but not limited to immunosuppression, malignancy, posterior leukoencephalopathy syndrome, and serious infections.  The patient understands that monitoring is required including a PPD at baseline and must alert us or the primary physician if symptoms of infection or other concerning signs are noted. Cyclosporine Counseling:  I discussed with the patient the risks of cyclosporine including but not limited to hypertension, gingival hyperplasia,myelosuppression, immunosuppression, liver damage, kidney damage, neurotoxicity, lymphoma, and serious infections. The patient understands that monitoring is required including baseline blood pressure, CBC, CMP, lipid panel and uric acid, and then 1-2 times monthly CMP and blood pressure. Zyclara Counseling:  I discussed with the patient the risks of imiquimod including but not limited to erythema, scaling, itching, weeping, crusting, and pain.  Patient understands that the inflammatory response to imiquimod is variable from person to person and was educated regarded proper titration schedule.  If flu-like symptoms develop, patient knows to discontinue the medication and contact us. Dupixent Pregnancy And Lactation Text: This medication likely crosses the placenta but the risk for the fetus is uncertain. This medication is excreted in breast milk. Tazorac Pregnancy And Lactation Text: This medication is not safe during pregnancy. It is unknown if this medication is excreted in breast milk. Benzoyl Peroxide Counseling: Patient counseled that medicine may cause skin irritation and bleach clothing.  In the event of skin irritation, the patient was advised to reduce the amount of the drug applied or use it less frequently.   The patient verbalized understanding of the proper use and possible adverse effects of benzoyl peroxide.  All of the patient's questions and concerns were addressed. Niacinamide Pregnancy And Lactation Text: These medications are considered safe during pregnancy. Finasteride Female Counseling: Finasteride Counseling:  I discussed with the patient the risks of use of finasteride including but not limited to decreased libido and sexual dysfunction. Explained the teratogenic nature of the medication and stressed the importance of not getting pregnant during treatment. All of the patient's questions and concerns were addressed. Spironolactone Counseling: Patient advised regarding risks of diarrhea, abdominal pain, hyperkalemia, birth defects (for female patients), liver toxicity and renal toxicity. The patient may need blood work to monitor liver and kidney function and potassium levels while on therapy. The patient verbalized understanding of the proper use and possible adverse effects of spironolactone.  All of the patient's questions and concerns were addressed. Otezla Counseling: The side effects of Otezla were discussed with the patient, including but not limited to worsening or new depression, weight loss, diarrhea, nausea, upper respiratory tract infection, and headache. Patient instructed to call the office should any adverse effect occur.  The patient verbalized understanding of the proper use and possible adverse effects of Otezla.  All the patient's questions and concerns were addressed. Rifampin Counseling: I discussed with the patient the risks of rifampin including but not limited to liver damage, kidney damage, red-orange body fluids, nausea/vomiting and severe allergy. Griseofulvin Pregnancy And Lactation Text: This medication is Pregnancy Category X and is known to cause serious birth defects. It is unknown if this medication is excreted in breast milk but breast feeding should be avoided. Erivedge Pregnancy And Lactation Text: This medication is Pregnancy Category X and is absolutely contraindicated during pregnancy. It is unknown if it is excreted in breast milk. Klisyri Counseling:  I discussed with the patient the risks of Klisyri including but not limited to erythema, scaling, itching, weeping, crusting, and pain. Doxepin Counseling:  Patient advised that the medication is sedating and not to drive a car after taking this medication. Patient informed of potential adverse effects including but not limited to dry mouth, urinary retention, and blurry vision.  The patient verbalized understanding of the proper use and possible adverse effects of doxepin.  All of the patient's questions and concerns were addressed. Stelara Counseling:  I discussed with the patient the risks of ustekinumab including but not limited to immunosuppression, malignancy, posterior leukoencephalopathy syndrome, and serious infections.  The patient understands that monitoring is required including a PPD at baseline and must alert us or the primary physician if symptoms of infection or other concerning signs are noted. Cyclosporine Pregnancy And Lactation Text: This medication is Pregnancy Category C and it isn't know if it is safe during pregnancy. This medication is excreted in breast milk. Zoryve Pregnancy And Lactation Text: It is unknown if this medication can cause problems during pregnancy and breastfeeding. Ebglyss Counseling: I discussed with the patient the risks of lebrikizumab including but not limited to eye inflammation and irritation, cold sores, injection site reactions, allergic reactions and increased risk of parasitic infection. The patient understands that monitoring is required and they must alert us or the primary physician if symptoms of infection or other concerning signs are noted. Tazorac Counseling:  Patient advised that medication is irritating and drying.  Patient may need to apply sparingly and wash off after an hour before eventually leaving it on overnight.  The patient verbalized understanding of the proper use and possible adverse effects of tazorac.  All of the patient's questions and concerns were addressed. Azithromycin Counseling:  I discussed with the patient the risks of azithromycin including but not limited to GI upset, allergic reaction, drug rash, diarrhea, and yeast infections. Benzoyl Peroxide Pregnancy And Lactation Text: This medication is Pregnancy Category C. It is unknown if benzoyl peroxide is excreted in breast milk. Nsaids Counseling: NSAID Counseling: I discussed with the patient that NSAIDs should be taken with food. Prolonged use of NSAIDs can result in the development of stomach ulcers.  Patient advised to stop taking NSAIDs if abdominal pain occurs.  The patient verbalized understanding of the proper use and possible adverse effects of NSAIDs.  All of the patient's questions and concerns were addressed. Rifampin Pregnancy And Lactation Text: This medication is Pregnancy Category C and it isn't know if it is safe during pregnancy. It is also excreted in breast milk and should not be used if you are breast feeding. Otezla Pregnancy And Lactation Text: This medication is Pregnancy Category C and it isn't known if it is safe during pregnancy. It is unknown if it is excreted in breast milk. Finasteride Male Counseling: Finasteride Counseling:  I discussed with the patient the risks of use of finasteride including but not limited to decreased libido, decreased ejaculate volume, gynecomastia, and depression. Women should not handle medication.  All of the patient's questions and concerns were addressed. Klisyri Pregnancy And Lactation Text: It is unknown if this medication can harm a developing fetus or if it is excreted in breast milk. Itraconazole Counseling:  I discussed with the patient the risks of itraconazole including but not limited to liver damage, nausea/vomiting, neuropathy, and severe allergy.  The patient understands that this medication is best absorbed when taken with acidic beverages such as non-diet cola or ginger ale.  The patient understands that monitoring is required including baseline LFTs and repeat LFTs at intervals.  The patient understands that they are to contact us or the primary physician if concerning signs are noted. Erivedge Counseling- I discussed with the patient the risks of Erivedge including but not limited to nausea, vomiting, diarrhea, constipation, weight loss, changes in the sense of taste, decreased appetite, muscle spasms, and hair loss.  The patient verbalized understanding of the proper use and possible adverse effects of Erivedge.  All of the patient's questions and concerns were addressed. Spevigo Pregnancy And Lactation Text: The risk during pregnancy and breastfeeding is uncertain with this medication. This medication does cross the placenta. It is unknown if this medication is found in breast milk. Spironolactone Pregnancy And Lactation Text: This medication can cause feminization of the male fetus and should be avoided during pregnancy. The active metabolite is also found in breast milk. Methotrexate Counseling:  Patient counseled regarding adverse effects of methotrexate including but not limited to nausea, vomiting, abnormalities in liver function tests. Patients may develop mouth sores, rash, diarrhea, and abnormalities in blood counts. The patient understands that monitoring is required including LFT's and blood counts.  There is a rare possibility of scarring of the liver and lung problems that can occur when taking methotrexate. Persistent nausea, loss of appetite, pale stools, dark urine, cough, and shortness of breath should be reported immediately. Patient advised to discontinue methotrexate treatment at least three months before attempting to become pregnant.  I discussed the need for folate supplements while taking methotrexate.  These supplements can decrease side effects during methotrexate treatment. The patient verbalized understanding of the proper use and possible adverse effects of methotrexate.  All of the patient's questions and concerns were addressed. Doxepin Pregnancy And Lactation Text: This medication is Pregnancy Category C and it isn't known if it is safe during pregnancy. It is also excreted in breast milk and breast feeding isn't recommended. Zoryve Counseling:  I discussed with the patient that Zoryve is not for use in the eyes, mouth or vagina. The most commonly reported side effects include diarrhea, headache, insomnia, application site pain, upper respiratory tract infections, and urinary tract infections.  All of the patient's questions and concerns were addressed. Ebglyss Pregnancy And Lactation Text: This medication likely crosses the placenta but the risk for the fetus is uncertain. It is unknown if this medication is excreted in breast milk. Hyrimoz Counseling:  I discussed with the patient the risks of adalimumab including but not limited to myelosuppression, immunosuppression, autoimmune hepatitis, demyelinating diseases, lymphoma, and serious infections.  The patient understands that monitoring is required including a PPD at baseline and must alert us or the primary physician if symptoms of infection or other concerning signs are noted. Ozempic Counseling: I reviewed the possible side effects including: thyroid tumors, kidney disease, gallbladder disease, abdominal pain, constipation, diarrhea, nausea, vomiting and pancreatitis. Do not take this medication if you have a history or family history of multiple endocrine neoplasia syndrome type 2. Side effects reviewed, pt to contact office should one occur. Azithromycin Pregnancy And Lactation Text: This medication is considered safe during pregnancy and is also secreted in breast milk. Nsaids Pregnancy And Lactation Text: These medications are considered safe up to 30 weeks gestation. It is excreted in breast milk. Carac Counseling:  I discussed with the patient the risks of Carac including but not limited to erythema, scaling, itching, weeping, crusting, and pain. Dutasteride Pregnancy And Lactation Text: This medication is absolutely contraindicated in women, especially during pregnancy and breast feeding. Feminization of male fetuses is possible if taking while pregnant. Oxybutynin Counseling:  I discussed with the patient the risks of oxybutynin including but not limited to skin rash, drowsiness, dry mouth, difficulty urinating, and blurred vision. Sarecycline Counseling: Patient advised regarding possible photosensitivity and discoloration of the teeth, skin, lips, tongue and gums.  Patient instructed to avoid sunlight, if possible.  When exposed to sunlight, patients should wear protective clothing, sunglasses, and sunscreen.  The patient was instructed to call the office immediately if the following severe adverse effects occur:  hearing changes, easy bruising/bleeding, severe headache, or vision changes.  The patient verbalized understanding of the proper use and possible adverse effects of sarecycline.  All of the patient's questions and concerns were addressed. Spevigo Counseling: I discussed with the patient the risks of Spevigo including but not limited to fatigue, nasuea, vomiting, headache, pruritus, urinary tract infection, an infusion related reactions.  The patient understands that monitoring is required including screening for tuberculosis at baseline and yearly screening thereafter while continuing Spevigo therapy. They should contact us if symptoms of infection or other concerning signs are noted. Latisse Counseling: Lattise Counseling: I reviewed the possible side-effects of Latisse including itching, eye irritation, discoloration and exacerbating glaucoma. I also discussed application methods. Methotrexate Pregnancy And Lactation Text: This medication is Pregnancy Category X and is known to cause fetal harm. This medication is excreted in breast milk. Hydroxyzine Counseling: Patient advised that the medication is sedating and not to drive a car after taking this medication.  Patient informed of potential adverse effects including but not limited to dry mouth, urinary retention, and blurry vision.  The patient verbalized understanding of the proper use and possible adverse effects of hydroxyzine.  All of the patient's questions and concerns were addressed. Acitretin Counseling:  I discussed with the patient the risks of acitretin including but not limited to hair loss, dry lips/skin/eyes, liver damage, hyperlipidemia, depression/suicidal ideation, photosensitivity.  Serious rare side effects can include but are not limited to pancreatitis, pseudotumor cerebri, bony changes, clot formation/stroke/heart attack.  Patient understands that alcohol is contraindicated since it can result in liver toxicity and significantly prolong the elimination of the drug by many years. Oxempic Pregnancy And Lactation Text: The fetal risk of this medication is unknown and taking while pregnant is not recommended. It is unknown if this medication is present in breast milk. Enbrel Counseling:  I discussed with the patient the risks of etanercept including but not limited to myelosuppression, immunosuppression, autoimmune hepatitis, demyelinating diseases, lymphoma, and infections.  The patient understands that monitoring is required including a PPD at baseline and must alert us or the primary physician if symptoms of infection or other concerning signs are noted. Topical Retinoid counseling:  Patient advised to apply a pea-sized amount only at bedtime and wait 30 minutes after washing their face before applying.  If too drying, patient may add a non-comedogenic moisturizer. The patient verbalized understanding of the proper use and possible adverse effects of retinoids.  All of the patient's questions and concerns were addressed. Olumiant Counseling: I discussed with the patient the risks of Olumiant therapy including but not limited to upper respiratory tract infections, shingles, cold sores, and nausea. Live vaccines should be avoided.  This medication has been linked to serious infections; higher rate of mortality; malignancy and lymphoproliferative disorders; major adverse cardiovascular events; thrombosis; gastrointestinal perforations; neutropenia; lymphopenia; anemia; liver enzyme elevations; and lipid elevations. Bimzelx Pregnancy And Lactation Text: This medication crosses the placenta and the safety is uncertain during pregnancy. It is unknown if this medication is present in breast milk. Rituxan Counseling:  I discussed with the patient the risks of Rituxan infusions. Side effects can include infusion reactions, severe drug rashes including mucocutaneous reactions, reactivation of latent hepatitis and other infections and rarely progressive multifocal leukoencephalopathy.  All of the patient's questions and concerns were addressed. Topical Metronidazole Pregnancy And Lactation Text: This medication is Pregnancy Category B and considered safe during pregnancy.  It is also considered safe to use while breastfeeding. Aklief counseling:  Patient advised to apply a pea-sized amount only at bedtime and wait 30 minutes after washing their face before applying.  If too drying, patient may add a non-comedogenic moisturizer.  The most commonly reported side effects including irritation, redness, scaling, dryness, stinging, burning, itching, and increased risk of sunburn.  The patient verbalized understanding of the proper use and possible adverse effects of retinoids.  All of the patient's questions and concerns were addressed. Glycopyrrolate Pregnancy And Lactation Text: This medication is Pregnancy Category B and is considered safe during pregnancy. It is unknown if it is excreted breast milk. Metronidazole Counseling:  I discussed with the patient the risks of metronidazole including but not limited to seizures, nausea/vomiting, a metallic taste in the mouth, nausea/vomiting and severe allergy. Qbrexza Counseling:  I discussed with the patient the risks of Qbrexza including but not limited to headache, mydriasis, blurred vision, dry eyes, nasal dryness, dry mouth, dry throat, dry skin, urinary hesitation, and constipation.  Local skin reactions including erythema, burning, stinging, and itching can also occur. Eucrisa Counseling: Patient may experience a mild burning sensation during topical application. Eucrisa is not approved in children less than 3 months of age. Valtrex Pregnancy And Lactation Text: this medication is Pregnancy Category B and is considered safe during pregnancy. This medication is not directly found in breast milk but it's metabolite acyclovir is present. Azathioprine Pregnancy And Lactation Text: This medication is Pregnancy Category D and isn't considered safe during pregnancy. It is unknown if this medication is excreted in breast milk. Xolair Counseling:  Patient informed of potential adverse effects including but not limited to fever, muscle aches, rash and allergic reactions.  The patient verbalized understanding of the proper use and possible adverse effects of Xolair.  All of the patient's questions and concerns were addressed. Arava Counseling:  Patient counseled regarding adverse effects of Arava including but not limited to nausea, vomiting, abnormalities in liver function tests. Patients may develop mouth sores, rash, diarrhea, and abnormalities in blood counts. The patient understands that monitoring is required including LFTs and blood counts.  There is a rare possibility of scarring of the liver and lung problems that can occur when taking methotrexate. Persistent nausea, loss of appetite, pale stools, dark urine, cough, and shortness of breath should be reported immediately. Patient advised to discontinue Arava treatment and consult with a physician prior to attempting conception. The patient will have to undergo a treatment to eliminate Arava from the body prior to conception. Olumiant Pregnancy And Lactation Text: Based on animal studies, Olumiant may cause embryo-fetal harm when administered to pregnant women.  The medication should not be used in pregnancy.  Breastfeeding is not recommended during treatment. Nemluvio Pregnancy And Lactation Text: It is not known if Nemluvio causes fetal harm or is present in breast milk. Please proceed with caution if patients who are pregnant or breastfeeding. Topical Metronidazole Counseling: Metronidazole is a topical antibiotic medication. You may experience burning, stinging, redness, or allergic reactions.  Please call our office if you develop any problems from using this medication. Cimzia Counseling:  I discussed with the patient the risks of Cimzia including but not limited to immunosuppression, allergic reactions and infections.  The patient understands that monitoring is required including a PPD at baseline and must alert us or the primary physician if symptoms of infection or other concerning signs are noted. Hydroxychloroquine Counseling:  I discussed with the patient that a baseline ophthalmologic exam is needed at the start of therapy and every year thereafter while on therapy. A CBC may also be warranted for monitoring.  The side effects of this medication were discussed with the patient, including but not limited to agranulocytosis, aplastic anemia, seizures, rashes, retinopathy, and liver toxicity. Patient instructed to call the office should any adverse effect occur.  The patient verbalized understanding of the proper use and possible adverse effects of Plaquenil.  All the patient's questions and concerns were addressed. Aklief Pregnancy And Lactation Text: It is unknown if this medication is safe to use during pregnancy.  It is unknown if this medication is excreted in breast milk.  Breastfeeding women should use the topical cream on the smallest area of the skin for the shortest time needed while breastfeeding.  Do not apply to nipple and areola. Metronidazole Pregnancy And Lactation Text: This medication is Pregnancy Category B and considered safe during pregnancy.  It is also excreted in breast milk. Protopic Pregnancy And Lactation Text: This medication is Pregnancy Category C. It is unknown if this medication is excreted in breast milk when applied topically. Eucrisa Pregnancy And Lactation Text: This medication has not been assigned a Pregnancy Risk Category but animal studies failed to show danger with the topical medication. It is unknown if the medication is excreted in breast milk. Use Enhanced Medication Counseling?: No Cellcept Counseling:  I discussed with the patient the risks of mycophenolate mofetil including but not limited to infection/immunosuppression, GI upset, hypokalemia, hypercholesterolemia, bone marrow suppression, lymphoproliferative disorders, malignancy, GI ulceration/bleed/perforation, colitis, interstitial lung disease, kidney failure, progressive multifocal leukoencephalopathy, and birth defects.  The patient understands that monitoring is required including a baseline creatinine and regular CBC testing. In addition, patient must alert us immediately if symptoms of infection or other concerning signs are noted. Rinvoq Counseling: I discussed with the patient the risks of Rinvoq therapy including but not limited to upper respiratory tract infections, shingles, cold sores, bronchitis, nausea, cough, fever, acne, and headache. Live vaccines should be avoided.  This medication has been linked to serious infections; higher rate of mortality; malignancy and lymphoproliferative disorders; major adverse cardiovascular events; thrombosis; thrombocytopenia, anemia, and neutropenia; lipid elevations; liver enzyme elevations; and gastrointestinal perforations. Albendazole Counseling:  I discussed with the patient the risks of albendazole including but not limited to cytopenia, kidney damage, nausea/vomiting and severe allergy.  The patient understands that this medication is being used in an off-label manner. Nemluvio Counseling: I discussed with the patient the risks of nemolizumab including but not limited to headache, gastrointestinal complaints, nasopharyngitis, musculoskeletal complaints, injection site reactions, and allergic reactions. The patient understands that monitoring is required and they must alert us or the primary physician if any side effects are noted. Cimzia Pregnancy And Lactation Text: This medication crosses the placenta but can be considered safe in certain situations. Cimzia may be excreted in breast milk. Topical Ketoconazole Pregnancy And Lactation Text: This medication is Pregnancy Category B and is considered safe during pregnancy. It is unknown if it is excreted in breast milk. Hydroxychloroquine Pregnancy And Lactation Text: This medication has been shown to cause fetal harm but it isn't assigned a Pregnancy Risk Category. There are small amounts excreted in breast milk. Amzeeq Counseling: Amzeeq is a topical antibiotic foam which contains minocycline.  The most commonly reported side effect of Amzeeq is headache.  The medication is flammable and should not be applied near a fire, flame, or while smoking.  Sun precautions is recommended to prevent sunburn. Protopic Counseling: Patient may experience a mild burning sensation during topical application. Protopic is not approved in children less than 2 years of age. There have been case reports of hematologic and skin malignancies in patients using topical calcineurin inhibitors although causality is questionable. Hydroquinone Counseling:  Patient advised that medication may result in skin irritation, lightening (hypopigmentation), dryness, and burning.  In the event of skin irritation, the patient was advised to reduce the amount of the drug applied or use it less frequently.  Rarely, spots that are treated with hydroquinone can become darker (pseudoochronosis).  Should this occur, patient instructed to stop medication and call the office. The patient verbalized understanding of the proper use and possible adverse effects of hydroquinone.  All of the patient's questions and concerns were addressed. Minocycline Counseling: Patient advised regarding possible photosensitivity and discoloration of the teeth, skin, lips, tongue and gums.  Patient instructed to avoid sunlight, if possible.  When exposed to sunlight, patients should wear protective clothing, sunglasses, and sunscreen.  The patient was instructed to call the office immediately if the following severe adverse effects occur:  hearing changes, easy bruising/bleeding, severe headache, or vision changes.  The patient verbalized understanding of the proper use and possible adverse effects of minocycline.  All of the patient's questions and concerns were addressed. Tremfya Counseling: I discussed with the patient the risks of guselkumab including but not limited to immunosuppression, serious infections, and drug reactions.  The patient understands that monitoring is required including a PPD at baseline and must alert us or the primary physician if symptoms of infection or other concerning signs are noted. Cibinqo Counseling: I discussed with the patient the risks of Cibinqo therapy including but not limited to common cold, nausea, headache, cold sores, increased blood CPK levels, dizziness, UTIs, fatigue, acne, and vomitting. Live vaccines should be avoided.  This medication has been linked to serious infections; higher rate of mortality; malignancy and lymphoproliferative disorders; major adverse cardiovascular events; thrombosis; thrombocytopenia and lymphopenia; lipid elevations; and retinal detachment. Rinvoq Pregnancy And Lactation Text: Based on animal studies, Rinvoq may cause embryo-fetal harm when administered to pregnant women.  The medication should not be used in pregnancy.  Breastfeeding is not recommended during treatment and for 6 days after the last dose. Cosentyx Counseling:  I discussed with the patient the risks of Cosentyx including but not limited to worsening of Crohn's disease, immunosuppression, allergic reactions and infections.  The patient understands that monitoring is required including a PPD at baseline and must alert us or the primary physician if symptoms of infection or other concerning signs are noted. Detail Level: Simple Topical Ketoconazole Counseling: Patient counseled that this medication may cause skin irritation or allergic reactions.  In the event of skin irritation, the patient was advised to reduce the amount of the drug applied or use it less frequently.   The patient verbalized understanding of the proper use and possible adverse effects of ketoconazole.  All of the patient's questions and concerns were addressed. Amzeeq Pregnancy And Lactation Text: It is not recommended to use the product if you are pregnant. Cantharidin Pregnancy And Lactation Text: This medication has not been proven safe during pregnancy. It is unknown if this medication is excreted in breast milk. Low Dose Naltrexone Counseling- I discussed with the patient the potential risks and side effects of low dose naltrexone including but not limited to: more vivid dreams, headaches, nausea, vomiting, abdominal pain, fatigue, dizziness, and anxiety. Minocycline Pregnancy And Lactation Text: This medication is Pregnancy Category D and not consider safe during pregnancy. It is also excreted in breast milk. Birth Control Pills Pregnancy And Lactation Text: This medication should be avoided if pregnant and for the first 30 days post-partum. Fluconazole Counseling:  Patient counseled regarding adverse effects of fluconazole including but not limited to headache, diarrhea, nausea, upset stomach, liver function test abnormalities, taste disturbance, and stomach pain.  There is a rare possibility of liver failure that can occur when taking fluconazole.  The patient understands that monitoring of LFTs and kidney function test may be required, especially at baseline. The patient verbalized understanding of the proper use and possible adverse effects of fluconazole.  All of the patient's questions and concerns were addressed. Odomzo Counseling- I discussed with the patient the risks of Odomzo including but not limited to nausea, vomiting, diarrhea, constipation, weight loss, changes in the sense of taste, decreased appetite, muscle spasms, and hair loss.  The patient verbalized understanding of the proper use and possible adverse effects of Odomzo.  All of the patient's questions and concerns were addressed. Cyclophosphamide Counseling:  I discussed with the patient the risks of cyclophosphamide including but not limited to hair loss, hormonal abnormalities, decreased fertility, abdominal pain, diarrhea, nausea and vomiting, bone marrow suppression and infection. The patient understands that monitoring is required while taking this medication. Ivermectin Counseling:  Patient instructed to take medication on an empty stomach with a full glass of water.  Patient informed of potential adverse effects including but not limited to nausea, diarrhea, dizziness, itching, and swelling of the extremities or lymph nodes.  The patient verbalized understanding of the proper use and possible adverse effects of ivermectin.  All of the patient's questions and concerns were addressed. Infliximab Counseling:  I discussed with the patient the risks of infliximab including but not limited to myelosuppression, immunosuppression, autoimmune hepatitis, demyelinating diseases, lymphoma, and serious infections.  The patient understands that monitoring is required including a PPD at baseline and must alert us or the primary physician if symptoms of infection or other concerning signs are noted. Cibinqo Pregnancy And Lactation Text: It is unknown if this medication will adversely affect pregnancy or breast feeding.  You should not take this medication if you are currently pregnant or planning a pregnancy or while breastfeeding. Xeljanz Counseling: I discussed with the patient the risks of Xeljanz therapy including increased risk of infection, liver issues, headache, diarrhea, or cold symptoms. Live vaccines should be avoided. They were instructed to call if they have any problems. Include Pregnancy/Lactation Warning?: Add Automatically Based on Childbearing Potential and Patient Age Azelaic Acid Counseling: Patient counseled that medicine may cause skin irritation and to avoid applying near the eyes.  In the event of skin irritation, the patient was advised to reduce the amount of the drug applied or use it less frequently.   The patient verbalized understanding of the proper use and possible adverse effects of azelaic acid.  All of the patient's questions and concerns were addressed. Picato Counseling:  I discussed with the patient the risks of Picato including but not limited to erythema, scaling, itching, weeping, crusting, and pain. Imiquimod Counseling:  I discussed with the patient the risks of imiquimod including but not limited to erythema, scaling, itching, weeping, crusting, and pain.  Patient understands that the inflammatory response to imiquimod is variable from person to person and was educated regarded proper titration schedule.  If flu-like symptoms develop, patient knows to discontinue the medication and contact us. Low Dose Naltrexone Pregnancy And Lactation Text: Naltrexone is pregnancy category C.  There have been no adequate and well-controlled studies in pregnant women.  It should be used in pregnancy only if the potential benefit justifies the potential risk to the fetus.   Limited data indicates that naltrexone is minimally excreted into breastmilk. Birth Control Pills Counseling: Birth Control Pill Counseling: I discussed with the patient the potential side effects of OCPs including but not limited to increased risk of stroke, heart attack, thrombophlebitis, deep venous thrombosis, hepatic adenomas, breast changes, GI upset, headaches, and depression.  The patient verbalized understanding of the proper use and possible adverse effects of OCPs. All of the patient's questions and concerns were addressed. Wartpeel Counseling:  I discussed with the patient the risks of Wartpeel including but not limited to erythema, scaling, itching, weeping, crusting, and pain. Isotretinoin Pregnancy And Lactation Text: This medication is Pregnancy Category X and is considered extremely dangerous during pregnancy. It is unknown if it is excreted in breast milk. Wegovy Counseling: I reviewed the possible side effects including: thyroid tumors, kidney disease, gallbladder disease, abdominal pain, constipation, diarrhea, nausea, vomiting and pancreatitis. Do not take this medication if you have a history or family history of multiple endocrine neoplasia syndrome type 2. Side effects reviewed, pt to contact office should one occur. Over The Counter Salicylic Acid Pregnancy And Lactation Text: It is not recommended to use high dose OTC salicylic acid while pregnant. Lower dose topical preparations are considered safe. 5-Fu Pregnancy And Lactation Text: This medication is Pregnancy Category X and contraindicated in pregnancy and in women who may become pregnant. It is unknown if this medication is excreted in breast milk. Dapsone Counseling: I discussed with the patient the risks of dapsone including but not limited to hemolytic anemia, agranulocytosis, rashes, methemoglobinemia, kidney failure, peripheral neuropathy, headaches, GI upset, and liver toxicity.  Patients who start dapsone require monitoring including baseline LFTs and weekly CBCs for the first month, then every month thereafter.  The patient verbalized understanding of the proper use and possible adverse effects of dapsone.  All of the patient's questions and concerns were addressed. Clindamycin Pregnancy And Lactation Text: This medication can be used in pregnancy if certain situations. Clindamycin is also present in breast milk. Thalidomide Counseling: I discussed with the patient the risks of thalidomide including but not limited to birth defects, anxiety, weakness, chest pain, dizziness, cough and severe allergy. Opzelura Counseling:  I discussed with the patient the risks of Opzelura including but not limited to nasopharngitis, bronchitis, ear infection, eosinophila, hives, diarrhea, folliculitis, tonsillitis, and rhinorrhea.  Taken orally, this medication has been linked to serious infections; higher rate of mortality; malignancy and lymphoproliferative disorders; major adverse cardiovascular events; thrombosis; thrombocytopenia, anemia, and neutropenia; and lipid elevations. Simlandi Counseling:  I discussed with the patient the risks of adalimumab including but not limited to myelosuppression, immunosuppression, autoimmune hepatitis, demyelinating diseases, lymphoma, and serious infections.  The patient understands that monitoring is required including a PPD at baseline and must alert us or the primary physician if symptoms of infection or other concerning signs are noted. Topical Sulfur Applications Pregnancy And Lactation Text: This medication is considered safe during pregnancy and breast feeding secondary to limited systemic absorption. High Dose Vitamin A Counseling: Side effects reviewed, pt to contact office should one occur. Over the Counter Salicylic Acid Counseling: Over the counter salicylic acid preparations can be used effectively to treat warts at home. There are two types of application: liquid and plaster. Liquid preparations are applied like nail polish and the plaster applications are applied like a bandage (you may need to apply duct tape over the plaster to keep it in place). Dead and macerated skin should be removed regularly with a nail file or nail clippers for best results. Dapsone Pregnancy And Lactation Text: This medication is Pregnancy Category C and is not considered safe during pregnancy or breast feeding. Doxycycline Counseling:  Patient counseled regarding possible photosensitivity and increased risk for sunburn.  Patient instructed to avoid sunlight, if possible.  When exposed to sunlight, patients should wear protective clothing, sunglasses, and sunscreen.  The patient was instructed to call the office immediately if the following severe adverse effects occur:  hearing changes, easy bruising/bleeding, severe headache, or vision changes.  The patient verbalized understanding of the proper use and possible adverse effects of doxycycline.  All of the patient's questions and concerns were addressed. Drysol Counseling:  I discussed with the patient the risks of drysol/aluminum chloride including but not limited to skin rash, itching, irritation, burning. Opzelura Pregnancy And Lactation Text: There is insufficient data to evaluate drug-associated risk for major birth defects, miscarriage, or other adverse maternal or fetal outcomes.  There is a pregnancy registry that monitors pregnancy outcomes in pregnant persons exposed to the medication during pregnancy.  It is unknown if this medication is excreted in breast milk.  Do not breastfeed during treatment and for about 4 weeks after the last dose. Adbry Counseling: I discussed with the patient the risks of tralokinumab including but not limited to eye infection and irritation, cold sores, injection site reactions, worsening of asthma, allergic reactions and increased risk of parasitic infection.  Live vaccines should be avoided while taking tralokinumab. The patient understands that monitoring is required and they must alert us or the primary physician if symptoms of infection or other concerning signs are noted. Sotyktu Counseling:  I discussed the most common side effects of Sotyktu including: common cold, sore throat, sinus infections, cold sores, canker sores, folliculitis, and acne.  I also discussed more serious side effects of Sotyktu including but not limited to: serious allergic reactions; increased risk for infections such as TB; cancers such as lymphomas; rhabdomyolysis and elevated CPK; and elevated triglycerides and liver enzymes.  Topical Sulfur Applications Counseling: Topical Sulfur Counseling: Patient counseled that this medication may cause skin irritation or allergic reactions.  In the event of skin irritation, the patient was advised to reduce the amount of the drug applied or use it less frequently.   The patient verbalized understanding of the proper use and possible adverse effects of topical sulfur application.  All of the patient's questions and concerns were addressed. High Dose Vitamin A Pregnancy And Lactation Text: High dose vitamin A therapy is contraindicated during pregnancy and breast feeding. Zepbound Counseling: I reviewed the possible side effects including: thyroid tumors, kidney disease, gallbladder disease, abdominal pain, constipation, diarrhea, nausea, vomiting and pancreatitis. Do not take this medication if you have a history or family history of multiple endocrine neoplasia syndrome type 2. Side effects reviewed, pt to contact office should one occur. Rhofade Pregnancy And Lactation Text: This medication has not been assigned a Pregnancy Risk Category. It is unknown if the medication is excreted in breast milk. Gabapentin Counseling: I discussed with the patient the risks of gabapentin including but not limited to dizziness, somnolence, fatigue and ataxia. Doxycycline Pregnancy And Lactation Text: This medication is Pregnancy Category D and not consider safe during pregnancy. It is also excreted in breast milk but is considered safe for shorter treatment courses. Tranexamic Acid Counseling:  Patient advised of the small risk of bleeding problems with tranexamic acid. They were also instructed to call if they developed any nausea, vomiting or diarrhea. All of the patient's questions and concerns were addressed. Siliq Counseling:  I discussed with the patient the risks of Siliq including but not limited to new or worsening depression, suicidal thoughts and behavior, immunosuppression, malignancy, posterior leukoencephalopathy syndrome, and serious infections.  The patient understands that monitoring is required including a PPD at baseline and must alert us or the primary physician if symptoms of infection or other concerning signs are noted. There is also a special program designed to monitor depression which is required with Siliq. Sotyktu Pregnancy And Lactation Text: There is insufficient data to evaluate whether or not Sotyktu is safe to use during pregnancy.   It is not known if Sotyktu passes into breast milk and whether or not it is safe to use when breastfeeding.   Adbry Pregnancy And Lactation Text: It is unknown if this medication will adversely affect pregnancy or breast feeding. Topical Steroids Applications Pregnancy And Lactation Text: Most topical steroids are considered safe to use during pregnancy and lactation.  Any topical steroid applied to the breast or nipple should be washed off before breastfeeding. Rhofade Counseling: Rhofade is a topical medication which can decrease superficial blood flow where applied. Side effects are uncommon and include stinging, redness and allergic reactions. Gabapentin Pregnancy And Lactation Text: This medication is Pregnancy Category C and isn't considered safe during pregnancy. It is excreted in breast milk. Erythromycin Counseling:  I discussed with the patient the risks of erythromycin including but not limited to GI upset, allergic reaction, drug rash, diarrhea, increase in liver enzymes, and yeast infections. Elidel Counseling: Patient may experience a mild burning sensation during topical application. Elidel is not approved in children less than 2 years of age. There have been case reports of hematologic and skin malignancies in patients using topical calcineurin inhibitors although causality is questionable. Tranexamic Acid Pregnancy And Lactation Text: It is unknown if this medication is safe during pregnancy or breast feeding. Litfulo Pregnancy And Lactation Text: Based on animal studies, Lifulo may cause embryo-fetal harm when administered to pregnant women.  The medication should not be used in pregnancy.  Breastfeeding is not recommended during treatment. Rituxan Pregnancy And Lactation Text: This medication is Pregnancy Category C and it isn't know if it is safe during pregnancy. It is unknown if this medication is excreted in breast milk but similar antibodies are known to be excreted. Bimzelx Counseling:  I discussed with the patient the risks of Bimzelx including but not limited to depression, immunosuppression, allergic reactions and infections.  The patient understands that monitoring is required including a PPD at baseline and must alert us or the primary physician if symptoms of infection or other concerning signs are noted. Topical Steroids Counseling: I discussed with the patient that prolonged use of topical steroids can result in the increased appearance of superficial blood vessels (telangiectasias), lightening (hypopigmentation) and thinning of the skin (atrophy).  Patient understands to avoid using high potency steroids in skin folds, the groin or the face.  The patient verbalized understanding of the proper use and possible adverse effects of topical steroids.  All of the patient's questions and concerns were addressed. Glycopyrrolate Counseling:  I discussed with the patient the risks of glycopyrrolate including but not limited to skin rash, drowsiness, dry mouth, difficulty urinating, and blurred vision. Erythromycin Pregnancy And Lactation Text: This medication is Pregnancy Category B and is considered safe during pregnancy. It is also excreted in breast milk. Qbrexza Pregnancy And Lactation Text: There is no available data on Qbrexza use in pregnant women.  There is no available data on Qbrexza use in lactation. Xolair Pregnancy And Lactation Text: This medication is Pregnancy Category B and is considered safe during pregnancy. This medication is excreted in breast milk. Valtrex Counseling: I discussed with the patient the risks of valacyclovir including but not limited to kidney damage, nausea, vomiting and severe allergy.  The patient understands that if the infection seems to be worsening or is not improving, they are to call. Azathioprine Counseling:  I discussed with the patient the risks of azathioprine including but not limited to myelosuppression, immunosuppression, hepatotoxicity, lymphoma, and infections.  The patient understands that monitoring is required including baseline LFTs, Creatinine, possible TPMP genotyping and weekly CBCs for the first month and then every 2 weeks thereafter.  The patient verbalized understanding of the proper use and possible adverse effects of azathioprine.  All of the patient's questions and concerns were addressed. Bactrim Counseling:  I discussed with the patient the risks of sulfa antibiotics including but not limited to GI upset, allergic reaction, drug rash, diarrhea, dizziness, photosensitivity, and yeast infections.  Rarely, more serious reactions can occur including but not limited to aplastic anemia, agranulocytosis, methemoglobinemia, blood dyscrasias, liver or kidney failure, lung infiltrates or desquamative/blistering drug rashes. Olanzapine Counseling- I discussed with the patient the common side effects of olanzapine including but are not limited to: lack of energy, dry mouth, increased appetite, sleepiness, tremor, constipation, dizziness, changes in behavior, or restlessness.  Explained that teenagers are more likely to experience headaches, abdominal pain, pain in the arms or legs, tiredness, and sleepiness.  Serious side effects include but are not limited: increased risk of death in elderly patients who are confused, have memory loss, or dementia-related psychosis; hyperglycemia; increased cholesterol and triglycerides; and weight gain. Dutasteride Female Counseling: Dutasteride Counseling:  I discussed with the patient the risks of use of dutasteride including but not limited to decreased libido and sexual dysfunction. Explained the teratogenic nature of the medication and stressed the importance of not getting pregnant during treatment. All of the patient's questions and concerns were addressed. Latisse Pregnancy And Lactation Text: It is not recommended to use Latisse if you are pregnant or trying to become pregnant. Ketoconazole Counseling:   Patient counseled regarding improving absorption with orange juice.  Adverse effects include but are not limited to breast enlargement, headache, diarrhea, nausea, upset stomach, liver function test abnormalities, taste disturbance, and stomach pain.  There is a rare possibility of liver failure that can occur when taking ketoconazole. The patient understands that monitoring of LFTs may be required, especially at baseline. The patient verbalized understanding of the proper use and possible adverse effects of ketoconazole.  All of the patient's questions and concerns were addressed. Prednisone Counseling:  I discussed with the patient the risks of prolonged use of prednisone including but not limited to weight gain, insomnia, osteoporosis, mood changes, diabetes, susceptibility to infection, glaucoma and high blood pressure.  In cases where prednisone use is prolonged, patients should be monitored with blood pressure checks, serum glucose levels and an eye exam.  Additionally, the patient may need to be placed on GI prophylaxis, PCP prophylaxis, and calcium and vitamin D supplementation and/or a bisphosphonate.  The patient verbalized understanding of the proper use and the possible adverse effects of prednisone.  All of the patient's questions and concerns were addressed. Hydroxyzine Pregnancy And Lactation Text: This medication is not safe during pregnancy and should not be taken. It is also excreted in breast milk and breast feeding isn't recommended. Acitretin Pregnancy And Lactation Text: This medication is Pregnancy Category X and should not be given to women who are pregnant or may become pregnant in the future. This medication is excreted in breast milk. VTAMA Counseling: I discussed with the patient that VTAMA is not for use in the eyes, mouth or mouth. They should call the office if they develop any signs of allergic reactions to VTAMA. The patient verbalized understanding of the proper use and possible adverse effects of VTAMA.  All of the patient's questions and concerns were addressed. Humira Counseling:  I discussed with the patient the risks of adalimumab including but not limited to myelosuppression, immunosuppression, autoimmune hepatitis, demyelinating diseases, lymphoma, and serious infections.  The patient understands that monitoring is required including a PPD at baseline and must alert us or the primary physician if symptoms of infection or other concerning signs are noted. Soolantra Pregnancy And Lactation Text: This medication is Pregnancy Category C. This medication is considered safe during breast feeding. Clofazimine Counseling:  I discussed with the patient the risks of clofazimine including but not limited to skin and eye pigmentation, liver damage, nausea/vomiting, gastrointestinal bleeding and allergy. Saxenda Counseling: I reviewed the possible side effects including: thyroid tumors, kidney disease, gallbladder disease, abdominal pain, constipation, diarrhea, nausea, vomiting and pancreatitis. Do not take this medication if you have a history or family history of multiple endocrine neoplasia syndrome type 2. Side effects reviewed, pt to contact office should one occur. Bactrim Pregnancy And Lactation Text: This medication is Pregnancy Category D and is known to cause fetal risk.  It is also excreted in breast milk. Calcipotriene Counseling:  I discussed with the patient the risks of calcipotriene including but not limited to erythema, scaling, itching, and irritation. Olanzapine Pregnancy And Lactation Text: This medication is pregnancy category C.   There are no adequate and well controlled trials with olanzapine in pregnant females.  Olanzapine should be used during pregnancy only if the potential benefit justifies the potential risk to the fetus.   In a study in lactating healthy women, olanzapine was excreted in breast milk.  It is recommended that women taking olanzapine should not breast feed. Tetracycline Counseling: Patient counseled regarding possible photosensitivity and increased risk for sunburn.  Patient instructed to avoid sunlight, if possible.  When exposed to sunlight, patients should wear protective clothing, sunglasses, and sunscreen.  The patient was instructed to call the office immediately if the following severe adverse effects occur:  hearing changes, easy bruising/bleeding, severe headache, or vision changes.  The patient verbalized understanding of the proper use and possible adverse effects of tetracycline.  All of the patient's questions and concerns were addressed. Patient understands to avoid pregnancy while on therapy due to potential birth defects. Dutasteride Male Counseling: Dutasteride Counseling:  I discussed with the patient the risks of use of dutasteride including but not limited to decreased libido, decreased ejaculate volume, and gynecomastia. Women who can become pregnant should not handle medication.  All of the patient's questions and concerns were addressed. Propranolol Counseling:  I discussed with the patient the risks of propranolol including but not limited to low heart rate, low blood pressure, low blood sugar, restlessness and increased cold sensitivity. They should call the office if they experience any of these side effects. Minoxidil Counseling: Minoxidil is a topical medication which can increase blood flow where it is applied. It is uncertain how this medication increases hair growth. Side effects are uncommon and include stinging and allergic reactions. Ketoconazole Pregnancy And Lactation Text: This medication is Pregnancy Category C and it isn't know if it is safe during pregnancy. It is also excreted in breast milk and breast feeding isn't recommended. Skyrizi Counseling: I discussed with the patient the risks of risankizumab-rzaa including but not limited to immunosuppression, and serious infections.  The patient understands that monitoring is required including a PPD at baseline and must alert us or the primary physician if symptoms of infection or other concerning signs are noted. Winlevi Pregnancy And Lactation Text: This medication is considered safe during pregnancy and breastfeeding. Bexarotene Counseling:  I discussed with the patient the risks of bexarotene including but not limited to hair loss, dry lips/skin/eyes, liver abnormalities, hyperlipidemia, pancreatitis, depression/suicidal ideation, photosensitivity, drug rash/allergic reactions, hypothyroidism, anemia, leukopenia, infection, cataracts, and teratogenicity.  Patient understands that they will need regular blood tests to check lipid profile, liver function tests, white blood cell count, thyroid function tests and pregnancy test if applicable. Cephalexin Counseling: I counseled the patient regarding use of cephalexin as an antibiotic for prophylactic and/or therapeutic purposes. Cephalexin (commonly prescribed under brand name Keflex) is a cephalosporin antibiotic which is active against numerous classes of bacteria, including most skin bacteria. Side effects may include nausea, diarrhea, gastrointestinal upset, rash, hives, yeast infections, and in rare cases, hepatitis, kidney disease, seizures, fever, confusion, neurologic symptoms, and others. Patients with severe allergies to penicillin medications are cautioned that there is about a 10% incidence of cross-reactivity with cephalosporins. When possible, patients with penicillin allergies should use alternatives to cephalosporins for antibiotic therapy. Soolantra Counseling: I discussed with the patients the risks of topial Soolantra. This is a medicine which decreases the number of mites and inflammation in the skin. You experience burning, stinging, eye irritation or allergic reactions.  Please call our office if you develop any problems from using this medication. Calcipotriene Pregnancy And Lactation Text: The use of this medication during pregnancy or lactation is not recommended as there is insufficient data. Propranolol Pregnancy And Lactation Text: This medication is Pregnancy Category C and it isn't known if it is safe during pregnancy. It is excreted in breast milk. Oral Minoxidil Counseling- I discussed with the patient the risks of oral minoxidil including but not limited to shortness of breath, swelling of the feet or ankles, dizziness, lightheadedness, unwanted hair growth and allergic reaction.  The patient verbalized understanding of the proper use and possible adverse effects of oral minoxidil.  All of the patient's questions and concerns were addressed. Terbinafine Counseling: Patient counseling regarding adverse effects of terbinafine including but not limited to headache, diarrhea, rash, upset stomach, liver function test abnormalities, itching, taste/smell disturbance, nausea, abdominal pain, and flatulence.  There is a rare possibility of liver failure that can occur when taking terbinafine.  The patient understands that a baseline LFT and kidney function test may be required. The patient verbalized understanding of the proper use and possible adverse effects of terbinafine.  All of the patient's questions and concerns were addressed. Opioid Pregnancy And Lactation Text: These medications can lead to premature delivery and should be avoided during pregnancy. These medications are also present in breast milk in small amounts. Winlevi Counseling:  I discussed with the patient the risks of topical clascoterone including but not limited to erythema, scaling, itching, and stinging. Patient voiced their understanding. Bexarotene Pregnancy And Lactation Text: This medication is Pregnancy Category X and should not be given to women who are pregnant or may become pregnant. This medication should not be used if you are breast feeding. Colchicine Counseling:  Patient counseled regarding adverse effects including but not limited to stomach upset (nausea, vomiting, stomach pain, or diarrhea).  Patient instructed to limit alcohol consumption while taking this medication.  Colchicine may reduce blood counts especially with prolonged use.  The patient understands that monitoring of kidney function and blood counts may be required, especially at baseline. The patient verbalized understanding of the proper use and possible adverse effects of colchicine.  All of the patient's questions and concerns were addressed. Semaglutide Counseling: I reviewed the possible side effects including: thyroid tumors, kidney disease, gallbladder disease, abdominal pain, constipation, diarrhea, nausea, vomiting and pancreatitis. Do not take this medication if you have a history or family history of multiple endocrine neoplasia syndrome type 2. Side effects reviewed, pt to contact office should one occur. Solaraze Pregnancy And Lactation Text: This medication is Pregnancy Category B and is considered safe. There is some data to suggest avoiding during the third trimester. It is unknown if this medication is excreted in breast milk. Cephalexin Pregnancy And Lactation Text: This medication is Pregnancy Category B and considered safe during pregnancy.  It is also excreted in breast milk but can be used safely for shorter doses. Cantharidin Counseling:  I discussed with the patient the risks of Cantharidin including but not limited to pain, redness, burning, itching, and blistering. SSKI Counseling:  I discussed with the patient the risks of SSKI including but not limited to thyroid abnormalities, metallic taste, GI upset, fever, headache, acne, arthralgias, paraesthesias, lymphadenopathy, easy bleeding, arrhythmias, and allergic reaction. Mirvaso Counseling: Mirvaso is a topical medication which can decrease superficial blood flow where applied. Side effects are uncommon and include stinging, redness and allergic reactions. Opioid Counseling: I discussed with the patient the potential side effects of opioids including but not limited to addiction, altered mental status, and depression. I stressed avoiding alcohol, benzodiazepines, muscle relaxants and sleep aids unless specifically okayed by a physician. The patient verbalized understanding of the proper use and possible adverse effects of opioids. All of the patient's questions and concerns were addressed. They were instructed to flush the remaining pills down the toilet if they did not need them for pain. Simponi Counseling:  I discussed with the patient the risks of golimumab including but not limited to myelosuppression, immunosuppression, autoimmune hepatitis, demyelinating diseases, lymphoma, and serious infections.  The patient understands that monitoring is required including a PPD at baseline and must alert us or the primary physician if symptoms of infection or other concerning signs are noted. Isotretinoin Counseling: Patient should get monthly blood tests, not donate blood, not drive at night if vision affected, not share medication, and not undergo elective surgery for 6 months after tx completed. Side effects reviewed, pt to contact office should one occur. Solaraze Counseling:  I discussed with the patient the risks of Solaraze including but not limited to erythema, scaling, itching, weeping, crusting, and pain. 5-Fu Counseling: 5-Fluorouracil Counseling:  I discussed with the patient the risks of 5-fluorouracil including but not limited to erythema, scaling, itching, weeping, crusting, and pain. Clindamycin Counseling: I counseled the patient regarding use of clindamycin as an antibiotic for prophylactic and/or therapeutic purposes. Clindamycin is active against numerous classes of bacteria, including skin bacteria. Side effects may include nausea, diarrhea, gastrointestinal upset, rash, hives, yeast infections, and in rare cases, colitis. Sski Pregnancy And Lactation Text: This medication is Pregnancy Category D and isn't considered safe during pregnancy. It is excreted in breast milk.

## 2025-08-12 ENCOUNTER — APPOINTMENT (OUTPATIENT)
Dept: OBGYN | Facility: CLINIC | Age: 40
End: 2025-08-12

## 2025-08-19 ENCOUNTER — TRANSCRIPTION ENCOUNTER (OUTPATIENT)
Age: 40
End: 2025-08-19

## (undated) DEVICE — DRSG DERMABOND PRINEO 22CM